# Patient Record
Sex: MALE | NOT HISPANIC OR LATINO | Employment: UNEMPLOYED | URBAN - METROPOLITAN AREA
[De-identification: names, ages, dates, MRNs, and addresses within clinical notes are randomized per-mention and may not be internally consistent; named-entity substitution may affect disease eponyms.]

---

## 2023-12-11 ENCOUNTER — OFFICE VISIT (OUTPATIENT)
Dept: PRIMARY CARE | Facility: CLINIC | Age: 28
End: 2023-12-11
Payer: COMMERCIAL

## 2023-12-11 VITALS
HEIGHT: 75 IN | BODY MASS INDEX: 14.42 KG/M2 | WEIGHT: 116 LBS | DIASTOLIC BLOOD PRESSURE: 80 MMHG | SYSTOLIC BLOOD PRESSURE: 102 MMHG

## 2023-12-11 DIAGNOSIS — G71.11 MYOTONIC MUSCULAR DYSTROPHY (MULTI): Primary | ICD-10-CM

## 2023-12-11 DIAGNOSIS — Z00.00 HEALTH CARE MAINTENANCE: ICD-10-CM

## 2023-12-11 PROBLEM — G47.00 INSOMNIA: Status: RESOLVED | Noted: 2023-12-11 | Resolved: 2023-12-11

## 2023-12-11 PROBLEM — F32.A ANXIETY AND DEPRESSION: Status: ACTIVE | Noted: 2023-12-11

## 2023-12-11 PROBLEM — E87.5 HIGH POTASSIUM: Status: RESOLVED | Noted: 2023-12-11 | Resolved: 2023-12-11

## 2023-12-11 PROBLEM — K59.09 CHRONIC CONSTIPATION: Status: ACTIVE | Noted: 2023-12-11

## 2023-12-11 PROBLEM — F41.9 ANXIETY AND DEPRESSION: Status: ACTIVE | Noted: 2023-12-11

## 2023-12-11 PROBLEM — R09.A2 GLOBUS SENSATION: Status: RESOLVED | Noted: 2023-12-11 | Resolved: 2023-12-11

## 2023-12-11 PROBLEM — E55.9 VITAMIN D DEFICIENCY: Status: ACTIVE | Noted: 2023-12-11

## 2023-12-11 PROBLEM — R13.10 PILL DYSPHAGIA: Status: ACTIVE | Noted: 2023-12-11

## 2023-12-11 PROCEDURE — 90471 IMMUNIZATION ADMIN: CPT | Performed by: STUDENT IN AN ORGANIZED HEALTH CARE EDUCATION/TRAINING PROGRAM

## 2023-12-11 PROCEDURE — 1036F TOBACCO NON-USER: CPT | Performed by: STUDENT IN AN ORGANIZED HEALTH CARE EDUCATION/TRAINING PROGRAM

## 2023-12-11 PROCEDURE — 90472 IMMUNIZATION ADMIN EACH ADD: CPT | Performed by: STUDENT IN AN ORGANIZED HEALTH CARE EDUCATION/TRAINING PROGRAM

## 2023-12-11 PROCEDURE — 90686 IIV4 VACC NO PRSV 0.5 ML IM: CPT | Performed by: STUDENT IN AN ORGANIZED HEALTH CARE EDUCATION/TRAINING PROGRAM

## 2023-12-11 PROCEDURE — 99395 PREV VISIT EST AGE 18-39: CPT | Performed by: STUDENT IN AN ORGANIZED HEALTH CARE EDUCATION/TRAINING PROGRAM

## 2023-12-11 PROCEDURE — 90715 TDAP VACCINE 7 YRS/> IM: CPT | Performed by: STUDENT IN AN ORGANIZED HEALTH CARE EDUCATION/TRAINING PROGRAM

## 2023-12-11 NOTE — PROGRESS NOTES
Subjective   Patient ID: Anthony Arshad is a 28 y.o. male who presents for No chief complaint on file..  HPI  28yom here to establish care.  He has a history significant for anxiety as well as myotonic muscular dystrophy.  His only acute complaint today is of some left hip pain.  He has been working out and lifting heavy weights and has noticed some left hip pain when he lays flat and attempts to lift his leg off the bed.  He is limping a bit and it does interfere with his ambulation to an extent however it is not particularly painful when he stands up out of a chair or walks around.  No fevers or chills or anything systemic.  Additional concerns of some bumps on his penis, he has had some bumps on his shaft since he was about 14 years old and they have gotten progressively larger in number.  He has seen urology and dermatology with no answers for him.  STI screening was negative.  He would like to work with physical therapy because of his hip pain.  He has no other complaints or issues to talk about today.    PMHx: constipation, anxiety, myotonic muscular dystrophy, insomnia  SurgHx: cholecystectomy  FamHx: Myotonic dystrophy and mother type I, diabetes in the family  SocialHx: No illicit drugs drinking or smoking, lives with father, currently not working    12-point ROS was reviewed and is negative, unless otherwise noted in HPI    Objective   Vitals:    12/11/23 1308   BP: 102/80      GEN: alert, conversant   HEENT: PERRL, EOMI    NECK: supple, no LAD appreciated  CHEST: appropriate respiratory effort  CV: RRR   MSK: Left hip weakness against flexion at the hip, no pain on internal/external rotation, cachectic musculature diffusely  ABD: soft, nondistended, nontender  EXT: no obvious deformities  SKIN: warm, dry    Assessment and Plan:  1.  Left hip pain  Suspect flexor strain.   - Advised continued supportive care with stretches, icing, rest    2.  Myotonic muscular dystrophy  Relatively active.  Understanding  of his disease.  Does not follow with any specialist.  - Referral to Neurology    3.  Penile shaft bumps  Exam performed with informed consent.  Small white bumps potentially hair follicles versus dry skin versus pearly penile papules.  Cream provided.  Okay with sending him back to urologist however they previously do not have answers for him.    4.  Anxiety  On hydroxyzine PRN. Not interest in daily anxiety meds.    5.  Chronic constipation  Well-controlled.    Health Maintenance:   Vaccines: Flu (updated today), Tdap (updated today), COVID (declines)  Screening: none  Labs: None needed today    RTC in 12 months, or sooner PRN    This note is not finalized until attending attestation is present.    Daryl Byrne    Trainee role: Resident    I saw and evaluated the patient. I personally obtained the key and critical portions of the history and physical exam or was physically present for key and critical portions performed by the trainee. I reviewed the trainee's documentation and discussed the patient with the trainee. I agree with the trainee's medical decision making as documented on the trainee's notes.    Lorne Ball, DO

## 2024-01-15 ENCOUNTER — APPOINTMENT (OUTPATIENT)
Dept: NEUROLOGY | Facility: CLINIC | Age: 29
End: 2024-01-15
Payer: COMMERCIAL

## 2024-01-16 ENCOUNTER — OFFICE VISIT (OUTPATIENT)
Dept: DERMATOLOGY | Facility: CLINIC | Age: 29
End: 2024-01-16
Payer: COMMERCIAL

## 2024-01-16 DIAGNOSIS — L85.3 XEROSIS CUTIS: ICD-10-CM

## 2024-01-16 DIAGNOSIS — L21.9 SEBORRHEIC DERMATITIS: ICD-10-CM

## 2024-01-16 DIAGNOSIS — L44.1 LICHEN NITIDUS: Primary | ICD-10-CM

## 2024-01-16 PROCEDURE — 1036F TOBACCO NON-USER: CPT | Performed by: DERMATOLOGY

## 2024-01-16 PROCEDURE — 99203 OFFICE O/P NEW LOW 30 MIN: CPT | Performed by: DERMATOLOGY

## 2024-01-16 RX ORDER — KETOCONAZOLE 20 MG/G
CREAM TOPICAL 2 TIMES DAILY
Qty: 60 G | Refills: 2 | Status: SHIPPED | OUTPATIENT
Start: 2024-01-16 | End: 2024-02-26

## 2024-01-16 ASSESSMENT — DERMATOLOGY PATIENT ASSESSMENT
HAVE YOU HAD OR DO YOU HAVE VASCULAR DISEASE: NO
ARE YOU AN ORGAN TRANSPLANT RECIPIENT: NO
WHERE ARE THESE NEW OR CHANGING LESIONS LOCATED: PENIS
DO YOU USE A TANNING BED: NO
DO YOU HAVE ANY NEW OR CHANGING LESIONS: YES
HAVE YOU HAD OR DO YOU HAVE A STAPH INFECTION: NO

## 2024-01-16 ASSESSMENT — DERMATOLOGY QUALITY OF LIFE (QOL) ASSESSMENT
RATE HOW BOTHERED YOU ARE BY EFFECTS OF YOUR SKIN PROBLEMS ON YOUR ACTIVITIES (EG, GOING OUT, ACCOMPLISHING WHAT YOU WANT, WORK ACTIVITIES OR YOUR RELATIONSHIPS WITH OTHERS): 0 - NEVER BOTHERED
ARE THERE EXCLUSIONS OR EXCEPTIONS FOR THE QUALITY OF LIFE ASSESSMENT: NO
RATE HOW BOTHERED YOU ARE BY SYMPTOMS OF YOUR SKIN PROBLEM (EG, ITCHING, STINGING BURNING, HURTING OR SKIN IRRITATION): 0 - NEVER BOTHERED
DATE THE QUALITY-OF-LIFE ASSESSMENT WAS COMPLETED: 66855
RATE HOW EMOTIONALLY BOTHERED YOU ARE BY YOUR SKIN PROBLEM (FOR EXAMPLE, WORRY, EMBARRASSMENT, FRUSTRATION): 0 - NEVER BOTHERED

## 2024-01-16 ASSESSMENT — ITCH NUMERIC RATING SCALE: HOW SEVERE IS YOUR ITCHING?: 5

## 2024-01-16 ASSESSMENT — PATIENT GLOBAL ASSESSMENT (PGA): PATIENT GLOBAL ASSESSMENT: PATIENT GLOBAL ASSESSMENT:  1 - CLEAR

## 2024-01-16 NOTE — PROGRESS NOTES
Subjective     Anthony Arshad is a 28 y.o. male who presents for the following: Suspicious Skin Lesion (Bumps on penis).     First time he was seen was by a urologist 10+ years ago, did not get an answer, referred to derm    Review of Systems:  No other skin or systemic complaints other than what is documented elsewhere in the note.    The following portions of the chart were reviewed this encounter and updated as appropriate:   Tobacco  Allergies  Meds  Problems  Med Hx  Surg Hx         Skin Cancer History  No skin cancer on file.      Specialty Problems    None       Objective   Well appearing patient in no apparent distress; mood and affect are within normal limits.    A focused skin examination was performed. All findings within normal limits unless otherwise noted below.    Assessment/Plan   1. Lichen nitidus  Dorsal Penile Shaft  Approx 15-20 pinpoint white flat-topped papules scattered on mid to distal shaft of penis    Reassurance. This is a benign condition and is not contagious. Unfortunately it can be difficult to treat. Today we will focus on treating the candida/seb derm rash (see below).     At next visit we could consider adding a topical steroid. We will defer today since it would likely worsen if he has tinea    2. Xerosis cutis  Dry skin    -Discussed nature of condition  -Discussed gentle skin care habits including using gentle soap such as Dove or Cetaphil to cleanse the skin.   -Recommend to avoid harsh cleansers/soaps such as: Dial, Lever 2000, Slovak Spring.  -Recommend to use emollients twice daily, once immediately after the shower while the skin is still damp.   -Recommended emollients include: Aveeno Eczema Therapy or Daily Moisturizer, La Roche Posay Lipikar AP Blam, or plain Vaseline.   -Recommend to avoid hot water/showers; lukewarm or cool water/showers will be beneficial.     - info in AVS        3. Seborrheic dermatitis  Mons Pubis, Root of the Penis  Patchy flaky scale  symmetrically over mon and extending onto shaft of penis, light pink erythema     Clinically favor seborrheic dermatitis but also consider early candidal intertrigo given recent exposure to candida    - rx ketoconazole as below    Related Procedures  Follow Up In Dermatology - Established Patient    Related Medications  ketoconazole (NIZOral) 2 % cream  Apply topically 2 times a day. To the affected area of itchy, scaly skin for 4 weeks        Follow up 2-3 months for rash if not resolved

## 2024-01-16 NOTE — PATIENT INSTRUCTIONS
Lichen nitidus - small bumps on penis  Benign, NOT contagious, but limited treatment options    Dry Skin    Dry skin can occur at any age and for many reasons. In general, skin becomes drier as we age. It is drier in the winter months than in summer months, and drier in low-humidity climates than in high humidity climates. Skin looks and feels dry because it lacks water. However, your skin’s natural oils are necessary to prevent it from drying out. Our goal is to replace the oil in order to keep the water in your skin.    In some people, areas of seriously dry skin can lead to a condition called dermatitis in which the skin becomes inflamed. When dermatitis is present, your dermatologist may prescribe a corticosteroid cream or ointment. This cream is applied to the affected areas only. Discontinue the corticosteroid cream when the dermatitis clears. The use of a moisturizing lotion, cream, or ointment should be continued to help prevent recurrence of the dermatitis.    Avoid hot baths and showers -- hot water removes your natural skin oils more quickly.  Keep showers or bath brief (5-10 minutes).  Use a mild soap or cleanser (bar or liquid body wash):  Dove      Cetaphil   Vanicream  Aveeno  Oil of Olay     If you have eczema or sensitive skin, look for formulas that are for “sensitive skin” or “fragrance-free.” “Unscented” products may still contain perfumes.  If your skin is extra-dry, you may only need to use soap daily to the underarms and groin. Use soap to the whole body only 2-3 times weekly.  When you get out of the bath or shower, pat skin dry.  Use the “3-Minute Rule” after you pat yourself dry: apply a moisturizer within 3 minutes of getting out of the bath.  Good moisturizers:  CeraVe    Aveeno    Petroleum jelly (Vaseline)  Cetaphil    Vegetable oil    Aquaphor  Nutraderm    Olive oil    Mineral oil  Eucerin   Neutrogena Norwegian Formula    *Note:  In general, creams are better moisturizers than  lotions.    You may need to reapply moisturizers 2-4 times a day.

## 2024-01-21 ENCOUNTER — PATIENT MESSAGE (OUTPATIENT)
Dept: DERMATOLOGY | Facility: HOSPITAL | Age: 29
End: 2024-01-21
Payer: COMMERCIAL

## 2024-02-21 ENCOUNTER — PATIENT MESSAGE (OUTPATIENT)
Dept: DERMATOLOGY | Facility: HOSPITAL | Age: 29
End: 2024-02-21
Payer: COMMERCIAL

## 2024-02-21 RX ORDER — POLYETHYLENE GLYCOL 3350 17 G/17G
POWDER, FOR SOLUTION ORAL AS NEEDED
COMMUNITY
Start: 2021-07-28

## 2024-02-21 RX ORDER — TRAZODONE HYDROCHLORIDE 50 MG/1
TABLET ORAL
COMMUNITY
Start: 2021-09-20 | End: 2024-02-26

## 2024-02-21 RX ORDER — HYDROXYZINE HYDROCHLORIDE 25 MG/1
TABLET, FILM COATED ORAL AS NEEDED
COMMUNITY
Start: 2021-07-08 | End: 2024-03-25

## 2024-02-21 RX ORDER — SERTRALINE HYDROCHLORIDE 25 MG/1
1 TABLET, FILM COATED ORAL DAILY
COMMUNITY
Start: 2021-08-06 | End: 2024-03-25 | Stop reason: SDUPTHER

## 2024-02-21 RX ORDER — ERGOCALCIFEROL 1.25 MG/1
CAPSULE ORAL
COMMUNITY
Start: 2021-09-24 | End: 2024-02-26

## 2024-02-22 NOTE — TELEPHONE ENCOUNTER
From: Anthony Arshad  To: Eliza Calderon MD  Sent: 2/21/2024 8:08 PM EST  Subject: No improvement     Hey doc so as prescribed I have finished the month treatment and I have seen little to no improvement I was wondering if there’s something else that can be done to fix the problem

## 2024-02-26 ENCOUNTER — OFFICE VISIT (OUTPATIENT)
Dept: GASTROENTEROLOGY | Facility: CLINIC | Age: 29
End: 2024-02-26
Payer: COMMERCIAL

## 2024-02-26 VITALS
SYSTOLIC BLOOD PRESSURE: 111 MMHG | TEMPERATURE: 97.9 F | BODY MASS INDEX: 14.25 KG/M2 | WEIGHT: 114.64 LBS | DIASTOLIC BLOOD PRESSURE: 64 MMHG | HEART RATE: 60 BPM | OXYGEN SATURATION: 98 % | HEIGHT: 75 IN

## 2024-02-26 DIAGNOSIS — K92.1 HEMATOCHEZIA: Primary | ICD-10-CM

## 2024-02-26 DIAGNOSIS — K59.09 CHRONIC CONSTIPATION: ICD-10-CM

## 2024-02-26 PROCEDURE — 1036F TOBACCO NON-USER: CPT | Performed by: NURSE PRACTITIONER

## 2024-02-26 PROCEDURE — 99213 OFFICE O/P EST LOW 20 MIN: CPT | Performed by: NURSE PRACTITIONER

## 2024-02-26 ASSESSMENT — ENCOUNTER SYMPTOMS
WHEEZING: 0
EYE PAIN: 0
PALPITATIONS: 0
CHILLS: 0
JOINT SWELLING: 0
MYALGIAS: 0
PHOTOPHOBIA: 0
HALLUCINATIONS: 0
LIGHT-HEADEDNESS: 0
ADENOPATHY: 0
HEADACHES: 0
DIZZINESS: 0
SHORTNESS OF BREATH: 0
NUMBNESS: 0
FATIGUE: 0
DYSURIA: 0
AGITATION: 0
HEMATURIA: 0
ARTHRALGIAS: 0
BACK PAIN: 0
WEAKNESS: 0
FLANK PAIN: 0
DIAPHORESIS: 0
SORE THROAT: 0
FEVER: 0
COUGH: 0
FREQUENCY: 0
NERVOUS/ANXIOUS: 0

## 2024-02-26 NOTE — PROGRESS NOTES
Subjective   Patient ID: Anthony Arshad is a 28 y.o. male who presents for rectal bleeding.     This is a 28 year old AAM with history of marijuana use, anxiety, myotonic muscular dystrophy, and chronic constipation who is presenting to the GI clinic for follow up. Last seen in the GI clinic by Chelsey Velasquez CNP on 9/8/21.     History per pt, father, and review of EMR     Pt is accompanied to this visit by his father     Reports 1-2 months ago he began having intermittent painless rectal bleeding with defecation. The blood is mostly bright, but has been dark red on occasion. He's seen blood on the toilet paper and coating the tips of his stools.     Reports having 1-2 large bowel movements per week which is improved from prior. Admits to moving his bowels only once a month in the past. Uses Miralax on occasion.     Constipation started at age 11.     Does not drink much water. Diet does not include many fruits and vegetables.     He's never been able to swallow pills secondary to muscular dystrophy.     Denies recent weight loss, abdominal pain, change in stool caliber, diarrhea, hematemesis, or melena.       Past medical history:  See above     Past surgical history:   Laparoscopic cholecystectomy (2021 )     Family history:   No known GI cancers or IBD    Social history:   Smokes marijuana daily   Drinks wine on rare occasion; no binge drinking  Denies use of tobacco       Review of Systems   Constitutional:  Negative for chills, diaphoresis, fatigue and fever.   HENT:  Negative for congestion, ear pain, hearing loss, sneezing and sore throat.    Eyes:  Negative for photophobia, pain and visual disturbance.   Respiratory:  Negative for cough, shortness of breath and wheezing.    Cardiovascular:  Negative for chest pain, palpitations and leg swelling.   Endocrine: Negative for cold intolerance and heat intolerance.   Genitourinary:  Negative for dysuria, flank pain, frequency and hematuria.   Musculoskeletal:   "Negative for arthralgias, back pain, gait problem, joint swelling and myalgias.   Skin:  Negative for rash.   Neurological:  Negative for dizziness, syncope, weakness, light-headedness, numbness and headaches.   Hematological:  Negative for adenopathy.   Psychiatric/Behavioral:  Negative for agitation and hallucinations. The patient is not nervous/anxious.        Allergies   Allergen Reactions    Peach Other     Throat itching     Tree Nuts Other     Peanut butter     Current Outpatient Medications   Medication Sig Dispense Refill    hydrOXYzine HCL (Atarax) 25 mg tablet Take by mouth if needed.      polyethylene glycol (Glycolax, Miralax) 17 gram/dose powder Take by mouth if needed.      sertraline (Zoloft) 25 mg tablet Take 1 tablet (25 mg) by mouth once daily.       No current facility-administered medications for this visit.        Objective     /64   Pulse 60   Temp 36.6 °C (97.9 °F)   Ht 1.905 m (6' 3\")   Wt 52 kg (114 lb 10.2 oz)   SpO2 98%   BMI 14.33 kg/m²     Physical Exam  Constitutional:       General: He is not in acute distress.     Appearance: Normal appearance.   HENT:      Head: Normocephalic and atraumatic.   Eyes:      Conjunctiva/sclera: Conjunctivae normal.   Cardiovascular:      Rate and Rhythm: Normal rate and regular rhythm.      Heart sounds: No murmur heard.     No gallop.   Pulmonary:      Effort: Pulmonary effort is normal.      Breath sounds: Normal breath sounds.   Abdominal:      General: Bowel sounds are normal. There is no distension.      Tenderness: There is no abdominal tenderness. There is no guarding.   Musculoskeletal:         General: No swelling or deformity. Normal range of motion.      Cervical back: Normal range of motion. No rigidity.   Skin:     General: Skin is warm and dry.      Coloration: Skin is not jaundiced.      Findings: No lesion or rash.   Neurological:      General: No focal deficit present.      Mental Status: He is alert and oriented to person, " place, and time.   Psychiatric:         Mood and Affect: Mood normal.     DAMIAN: No external hemorrhoids or anal fissures; no mass in rectal vault      Assessment/Plan   Problem List Items Addressed This Visit       Chronic constipation     Other Visit Diagnoses       Hematochezia    -  Primary    Relevant Orders    CBC           Hematochezia: question internal hemorrhoids in the setting of constipation. DAMIAN unrevealing. I do not believe there is any urgency for colonoscopy. Last CBC was in 2021 and was without anemia.   - check CBC  - will consider colonoscopy at follow up     2. Chronic constipation:  - recommend Miralax 17 grams once daily (and to use PRN if he develops diarrhea with regular use)   - advise high fiber diet; reading materials provided   - recommend drinking 64 ounces of water per day      3. Follow up:  - return to clinic in 8-10 weeks

## 2024-02-26 NOTE — PATIENT INSTRUCTIONS
Thanks for coming to the GI clinic to the GI clinic.     Please get blood work to check for anemia.     Use  polyethylene glycol (Miralax) 17 grams once daily. If you develop diarrhea, change to 17 grams once a day as needed.     Try to adhere to a high fiber diet.     Try to drink 64 ounces of water per day.     Follow up in 8-10 weeks.

## 2024-03-15 ENCOUNTER — APPOINTMENT (OUTPATIENT)
Dept: PRIMARY CARE | Facility: CLINIC | Age: 29
End: 2024-03-15
Payer: COMMERCIAL

## 2024-03-25 ENCOUNTER — OFFICE VISIT (OUTPATIENT)
Dept: PRIMARY CARE | Facility: CLINIC | Age: 29
End: 2024-03-25
Payer: COMMERCIAL

## 2024-03-25 VITALS
WEIGHT: 119.6 LBS | BODY MASS INDEX: 14.87 KG/M2 | DIASTOLIC BLOOD PRESSURE: 67 MMHG | SYSTOLIC BLOOD PRESSURE: 106 MMHG | HEIGHT: 75 IN

## 2024-03-25 DIAGNOSIS — F32.A ANXIETY AND DEPRESSION: Primary | ICD-10-CM

## 2024-03-25 DIAGNOSIS — Z13.220 LIPID SCREENING: ICD-10-CM

## 2024-03-25 DIAGNOSIS — G71.11 MYOTONIC MUSCULAR DYSTROPHY (MULTI): ICD-10-CM

## 2024-03-25 DIAGNOSIS — F41.9 ANXIETY AND DEPRESSION: Primary | ICD-10-CM

## 2024-03-25 DIAGNOSIS — E55.9 VITAMIN D DEFICIENCY: ICD-10-CM

## 2024-03-25 PROCEDURE — 1036F TOBACCO NON-USER: CPT | Performed by: NURSE PRACTITIONER

## 2024-03-25 PROCEDURE — 99214 OFFICE O/P EST MOD 30 MIN: CPT | Performed by: NURSE PRACTITIONER

## 2024-03-25 RX ORDER — SERTRALINE HYDROCHLORIDE 25 MG/1
25 TABLET, FILM COATED ORAL DAILY
Qty: 90 TABLET | Refills: 1 | Status: SHIPPED | OUTPATIENT
Start: 2024-03-25

## 2024-03-25 RX ORDER — HYDROXYZINE HYDROCHLORIDE 10 MG/5ML
25 SYRUP ORAL 3 TIMES DAILY PRN
Qty: 240 ML | Refills: 1 | Status: SHIPPED | OUTPATIENT
Start: 2024-03-25 | End: 2024-05-06

## 2024-03-25 ASSESSMENT — ENCOUNTER SYMPTOMS
RESTLESSNESS: 1
LOSS OF SENSATION IN FEET: 0
NERVOUS/ANXIOUS: 1
DEPRESSION: 0
OCCASIONAL FEELINGS OF UNSTEADINESS: 0

## 2024-03-25 ASSESSMENT — PATIENT HEALTH QUESTIONNAIRE - PHQ9
2. FEELING DOWN, DEPRESSED OR HOPELESS: NOT AT ALL
1. LITTLE INTEREST OR PLEASURE IN DOING THINGS: NOT AT ALL
SUM OF ALL RESPONSES TO PHQ9 QUESTIONS 1 AND 2: 0

## 2024-03-26 NOTE — PROGRESS NOTES
"Subjective   Patient ID: Anthony Arshad is a 28 y.o. male who presents for Anxiety.     is a 28 year old male who presents to the office today to re-establish care. He is accompanied by his father. He and his father requesting for him to restart medication for anxiety. Requesting oral suspension for as needed medication since he does have difficulty swallowing at times.     Medical history of myotonic muscular dystropy. Has been having increased upper body weakness. Father stated that  is often fidgety; particularly with his hands.    Anxiety  Presents for follow-up visit. Symptoms include nervous/anxious behavior and restlessness.          Review of Systems   Psychiatric/Behavioral:  The patient is nervous/anxious.    All other systems reviewed and are negative.      Objective   /67   Ht 1.905 m (6' 3\")   Wt 54.3 kg (119 lb 9.6 oz)   BMI 14.95 kg/m²     Physical Exam  Constitutional:       Appearance: Normal appearance.   HENT:      Head: Normocephalic and atraumatic.      Right Ear: Tympanic membrane, ear canal and external ear normal.      Left Ear: Tympanic membrane, ear canal and external ear normal.      Nose: Nose normal.      Mouth/Throat:      Mouth: Mucous membranes are moist.      Pharynx: Oropharynx is clear.   Eyes:      Extraocular Movements: Extraocular movements intact.      Conjunctiva/sclera: Conjunctivae normal.      Pupils: Pupils are equal, round, and reactive to light.   Cardiovascular:      Rate and Rhythm: Normal rate and regular rhythm.   Pulmonary:      Effort: Pulmonary effort is normal.      Breath sounds: Normal breath sounds.   Abdominal:      General: Abdomen is flat. Bowel sounds are normal.      Palpations: Abdomen is soft.   Musculoskeletal:         General: Normal range of motion.      Cervical back: Normal range of motion.      Comments: BUE 3/5. ROM of BUEs. Decreased ROM with abduction/adduction bilateral arms.    Skin:     General: Skin is warm and dry. "   Neurological:      General: No focal deficit present.      Mental Status: He is alert and oriented to person, place, and time. Mental status is at baseline.   Psychiatric:         Mood and Affect: Mood normal.         Behavior: Behavior normal.         Thought Content: Thought content normal.         Judgment: Judgment normal.         Assessment/Plan   Problem List Items Addressed This Visit             ICD-10-CM    Anxiety and depression - Primary F41.9, F32.A    Relevant Medications    sertraline (Zoloft) 25 mg tablet    hydrOXYzine (Atarax) 10 mg/5 mL syrup    Myotonic muscular dystrophy (CMS/HCC) G71.11     Stable. Continue to monitor.         Relevant Orders    Referral to Physical Therapy    Referral to Occupational Medicine    CBC and Auto Differential    Comprehensive Metabolic Panel    Referral to Pulmonology    Vitamin D deficiency E55.9    Relevant Orders    Vitamin D 25-Hydroxy,Total (for eval of Vitamin D levels)     Other Visit Diagnoses         Codes    Lipid screening     Z13.220    Relevant Orders    Lipid Panel          1) Anxiety: prescriptions refilled for Sertraline and Hydroxyzine. Discussed side effects of SSRI (nausea, vomiting, headache, insomnia, abdominal pain, fatigue, irritation, increased moodiness and diarrhea). Discussed with you that SSRIs help to restore the balance of Serotonin in the brain.    2) Myotonic muscular dystropy, type II: referral placed for OT/PT for upper body/extremities weakness.

## 2024-03-28 ENCOUNTER — TELEPHONE (OUTPATIENT)
Dept: PRIMARY CARE | Facility: CLINIC | Age: 29
End: 2024-03-28
Payer: COMMERCIAL

## 2024-03-29 DIAGNOSIS — G71.11 MYOTONIC MUSCULAR DYSTROPHY (MULTI): Primary | ICD-10-CM

## 2024-03-29 NOTE — TELEPHONE ENCOUNTER
Spoke with patient father and he said that when they tried to schedule for the appointment that they weren't able to because the  told them that it needed to say occupational therapy and not medicine.

## 2024-04-15 ENCOUNTER — EVALUATION (OUTPATIENT)
Dept: OCCUPATIONAL THERAPY | Facility: CLINIC | Age: 29
End: 2024-04-15
Payer: COMMERCIAL

## 2024-04-15 DIAGNOSIS — R29.898 BILATERAL ARM WEAKNESS: Primary | ICD-10-CM

## 2024-04-15 DIAGNOSIS — G71.11 MYOTONIC MUSCULAR DYSTROPHY (MULTI): ICD-10-CM

## 2024-04-15 PROCEDURE — 97165 OT EVAL LOW COMPLEX 30 MIN: CPT | Mod: GO

## 2024-04-15 PROCEDURE — 97110 THERAPEUTIC EXERCISES: CPT | Mod: GO

## 2024-04-15 ASSESSMENT — PAIN SCALES - GENERAL: PAINLEVEL_OUTOF10: 0 - NO PAIN

## 2024-04-15 ASSESSMENT — ENCOUNTER SYMPTOMS
LOSS OF SENSATION IN FEET: 0
DEPRESSION: 0
OCCASIONAL FEELINGS OF UNSTEADINESS: 1

## 2024-04-15 ASSESSMENT — PAIN - FUNCTIONAL ASSESSMENT: PAIN_FUNCTIONAL_ASSESSMENT: 0-10

## 2024-04-15 NOTE — PROGRESS NOTES
Occupational Therapy    Occupational Therapy Evaluation    Name: Anthony Arshad  MRN: [id number]  : 1995  Date: 04/15/24  Time Calculation  Start Time: 0540  Stop Time: 0630  Time Calculation (min): 50 min  OT Evaluation Time Entry  OT Evaluation (Low) Time Entry: 40  OT Therapeutic Procedures Time Entry  Therapeutic Exercise Time Entry: 10                  Visit: 1  Tolentino: 30 visits  Assessment:   Patient is a 28 year old male with dx of myotonic muscular dystrophy. Patient presents with severe BUE weakness impacting patient's abilities to carry out every day tasks. Patient would benefit from skilled OT to address the above impairments to maximize patient's functional abilities.   Plan:   OT POC to include: neuro re-ed, manual therapy, therapeutic exercise, therapeutic activity, HEP    1-2x a week for 5-6 weeks  Rehab potential: Good  Patient in agreement with plan     Subjective  Patient agreeable to OT evaluation. Accompanied by father. Patient reports a lot of difficulty donning coat, and reaching up for things. Patient reports has dad wash his hair in the shower.     Dad reports patient received dx at about 9-10 years old. Started seeing a progression more in high school, and then has been getting worse in the last couple of years.    Current Problem:  1. Bilateral arm weakness  Follow Up In Occupational Therapy      2. Myotonic muscular dystrophy (Multi)  Follow Up In Occupational Therapy        General:      General  Reason for Referral: OT eval treat  Referred By:  (REGLA Neely)  Per chart note:   Medical history of myotonic muscular dystrophy. Has been having increased upper body weakness.  Precautions:  Precautions  STEADI Fall Risk Score (The score of 4 or more indicates an increased risk of falling): 2  Vital Signs:   Not taken  Pain Assessment:  Pain Assessment  Pain Assessment: 0-10  Pain Score: 0 - No pain  Work History:  Not working  Prior Level of Function:  Independent  "  Roles/Routines:  Plays video games  Patient Goal:  \"I would like to at least maintain\"  Personal Factors that my impact Care:   Not driving  Objective   Neuro:  Observation:  R handed  Palpation:  Severe atrophy noted in all muscles of B shoulders   ROM:  B shoulder PROM  Strength:  Right: elevation: 3+/5, shoulder flexion: 2-/5, elbow flexion: 4-/5, wrist extension: 3+/5  R : 20#  Left:  elevation: 3/5, shoulder flexion: 1/5, elbow flexion: 4-/5, wrist extension: 3+/5  L : 19#  Coordination:  Intact digit opposition  Sensation:  Denies parathesias in BUEs  Outcome Measures:  OT Adult Other Outcome Measures  9 Hole Peg Test:  (R: 20 seconds L: 23 seconds)    OP EDUCATION:/Treatment:   Educated patient on towel slides on table top. Also educated and given a piece of dycem to assist with opening containers.    Goals:  Active       OT Goals       HEP       Start:  04/15/24    Expected End:  06/24/24       Will establish a home program to include UE stretching and strengthening, as well as educate patient on AE/DME and modifications to maintain function for ADL/IADL tasks.                "

## 2024-04-22 ENCOUNTER — OFFICE VISIT (OUTPATIENT)
Dept: DERMATOLOGY | Facility: HOSPITAL | Age: 29
End: 2024-04-22
Payer: COMMERCIAL

## 2024-04-22 DIAGNOSIS — L21.9 SEBORRHEIC DERMATITIS: ICD-10-CM

## 2024-04-22 DIAGNOSIS — L44.1 LICHEN NITIDUS: Primary | ICD-10-CM

## 2024-04-22 PROCEDURE — 99214 OFFICE O/P EST MOD 30 MIN: CPT | Performed by: DERMATOLOGY

## 2024-04-22 PROCEDURE — 1036F TOBACCO NON-USER: CPT | Performed by: DERMATOLOGY

## 2024-04-22 RX ORDER — HYDROCORTISONE 25 MG/G
OINTMENT TOPICAL 2 TIMES DAILY
Qty: 30 G | Refills: 3 | Status: SHIPPED | OUTPATIENT
Start: 2024-04-22

## 2024-04-22 NOTE — PATIENT INSTRUCTIONS
Topical steroids and non-steroidal medications for chronic skin conditions    Your chronic skin condition is:    Your topical steroids are:    Hydrocortisone 2.5%    Your non-steroidal topicals are:    Ketoconazole 2% cream    Steroid usually works fastest but can only be used for 2 weeks at a time for flares or every other day for maintenance  Non-steroids are safer for long-term use and can be used every day for longer than 2 weeks or can be alternated with the steroid for maintenance       Hydrocortisone twice per day for 2 weeks, then decrease to just 2-3x per week (weekends only or [facility]). Repeat as needed for flares every 4-6 weeksj  Ketoconazole on the days that you are not using the hydrocortisone

## 2024-04-22 NOTE — PROGRESS NOTES
Subjective     Anthony Arshad is a 28 y.o. male who presents for the following: Rash (Tried Ketoconazole cream - did not work at all . Using Nov[employer] Spring soap ).     Review of Systems:  No other skin or systemic complaints other than what is documented elsewhere in the note.    The following portions of the chart were reviewed this encounter and updated as appropriate:   Tobacco  Allergies  Meds  Problems  Med Hx  Surg Hx         Skin Cancer History  No skin cancer on file.      Specialty Problems    None       Objective   Well appearing patient in no apparent distress; mood and affect are within normal limits.    A focused skin examination was performed. All findings within normal limits unless otherwise noted below.    Assessment/Plan   1. Lichen nitidus  Pubic  >20 now white flat-toped papules on proximal shaft of penis    hydrocortisone 2.5 % ointment - Pubic  Apply topically 2 times a day. To affected areas on body for 2 weeks, then decrease to just 2-3x per week (weekends only or MWF). Repeat as needed for flares every 4-6 weeks    Related Procedures  Follow Up In Dermatology - Established Patient    2. Seborrheic dermatitis  Pubic  Resolved     Resolved, continue ketoconazole cream and intersperse with hydrocortisone     Related Procedures  Follow Up In Dermatology - Established Patient        Follow up 2-3 months

## 2024-05-06 ENCOUNTER — OFFICE VISIT (OUTPATIENT)
Dept: PULMONOLOGY | Facility: CLINIC | Age: 29
End: 2024-05-06
Payer: COMMERCIAL

## 2024-05-06 VITALS
HEART RATE: 72 BPM | SYSTOLIC BLOOD PRESSURE: 111 MMHG | RESPIRATION RATE: 18 BRPM | WEIGHT: 115 LBS | HEIGHT: 75 IN | OXYGEN SATURATION: 98 % | TEMPERATURE: 98.8 F | DIASTOLIC BLOOD PRESSURE: 72 MMHG | BODY MASS INDEX: 14.3 KG/M2

## 2024-05-06 DIAGNOSIS — G71.11 MYOTONIC MUSCULAR DYSTROPHY (MULTI): ICD-10-CM

## 2024-05-06 DIAGNOSIS — R06.02 SHORTNESS OF BREATH: Primary | ICD-10-CM

## 2024-05-06 PROCEDURE — 99204 OFFICE O/P NEW MOD 45 MIN: CPT | Performed by: INTERNAL MEDICINE

## 2024-05-06 PROCEDURE — 99214 OFFICE O/P EST MOD 30 MIN: CPT | Performed by: INTERNAL MEDICINE

## 2024-05-06 PROCEDURE — 1036F TOBACCO NON-USER: CPT | Performed by: INTERNAL MEDICINE

## 2024-05-06 ASSESSMENT — ENCOUNTER SYMPTOMS
PALPITATIONS: 0
COUGH: 0
WEAKNESS: 1
SHORTNESS OF BREATH: 1
UNEXPECTED WEIGHT CHANGE: 0

## 2024-05-06 ASSESSMENT — PAIN SCALES - GENERAL: PAINLEVEL: 0-NO PAIN

## 2024-05-06 NOTE — PROGRESS NOTES
Department of Medicine  Division of Pulmonary, Critical Care, and Sleep Medicine  Location  [facility], [address]    I was asked by Litzy Fernandez APRN-C*, to evaluate Anthony Arshad for muscular dystrophy. I have independently interviewed and examined the patient in the office and reviewed available records.     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 He states approximately 2 to 3 months ago he has noticed episodes of shortness of breath lasting approximately 1 hour.  These episodes have been occurring every 1 to 2 days or so.  There are no triggers for these episodes of dyspnea.  He denies any cough, sputum production, wheezing, recent illnesses.  He does smoke marijuana daily, and sometimes, these episodes of shortness of breath occur after smoking.  He states he is able to walk 1 to 2 miles on flat ground without any issues.  He has been admitted 1 time for respiratory issues, however, this was anxiety related.  At that time, his pH on ABG was 7.65 and his pCO2 is 23.  He has never had any formal pulmonary function testing done before.  He denies any nighttime symptoms of dyspnea.  He denies any snoring. He was diagnosed with muscular dystrophy around the age of 11.    PMH:  Past Medical History:   Diagnosis Date    Insomnia 12/11/2023    Irregular heart beat     Myotonic muscular dystrophy (Multi)        PSH:  Past Surgical History:   Procedure Laterality Date    CHOLECYSTECTOMY         FHx:  Family History   Problem Relation Name Age of Onset    Multiple sclerosis Mother      Diabetes Father         Social Hx:  Social History     Socioeconomic History    Marital status: Single     Spouse name: None    Number of children: None    Years of education: None    Highest education level: None   Occupational History    None   Tobacco Use    Smoking status: Never    Smokeless tobacco: Never   Substance and Sexual Activity    Alcohol use: Yes      Comment: socially    Drug use: Yes     Frequency: 7.0 times per week     Types: Marijuana     Comment: everyday    Sexual activity: None   Other Topics Concern    None   Social History Narrative    None     Social Determinants of Health     Financial Resource Strain: Not on file   Food Insecurity: Not on file   Transportation Needs: Not on file   Physical Activity: Not on file   Stress: Not on file   Social Connections: Not on file   Intimate Partner Violence: Not on file   Housing Stability: Not on file       Immunization History:  Immunization History   Administered Date(s) Administered    Flu vaccine (IIV4), preservative free *Check age/dose* 12/11/2023    HPV 9-valent vaccine (GARDASIL 9) 05/11/2016, 02/01/2018    HPV, Quadrivalent 08/14/2014    Hepatitis B vaccine, pediatric/adolescent (RECOMBIVAX, ENGERIX) 08/14/2014    Influenza, injectable, quadrivalent 02/01/2018, 03/06/2020, 01/23/2023    Novel Influenza-H1N1-09, nasal 11/23/2009    Tdap vaccine, age 7 year and older (BOOSTRIX, ADACEL) 11/23/2009, 12/11/2023       Current Medications:  Current Outpatient Medications   Medication Instructions    hydrocortisone 2.5 % ointment Topical, 2 times daily, To affected areas on body for 2 weeks, then decrease to just 2-3x per week (weekends only or MWF). Repeat as needed for flares every 4-6 weeks    hydrOXYzine (ATARAX) 25 mg, oral, 3 times daily PRN    polyethylene glycol (Glycolax, Miralax) 17 gram/dose powder oral, As needed    sertraline (ZOLOFT) 25 mg, oral, Daily        Drug Allergies/Intolerances:  Allergies   Allergen Reactions    Peach Other     Throat itching     Tree Nuts Other     Peanut butter        Review of Systems:  Review of Systems   Constitutional:  Negative for unexpected weight change.   Respiratory:  Positive for shortness of breath. Negative for cough.    Cardiovascular:  Negative for chest pain, palpitations and leg swelling.   Neurological:  Positive for weakness.        All other review  "of systems are negative and/or non-contributory.    Physical Examination:  Vitals:    05/06/24 1516   BP: 111/72   BP Location: Right arm   Patient Position: Sitting   Pulse: 72   Resp: 18   Temp: 37.1 °C (98.8 °F)   TempSrc: Temporal   SpO2: 98%   Weight: 52.2 kg (115 lb)   Height: 1.905 m (6' 3\")        GEN: thin frame.   EYES: GIOVANNA, EOMI  ENT: Mallampati I,   CV: RRR, no m/g/r  LUNGS: good effort, clear bilaterally, no w/r/r  ABD: Soft, nontender  EXT: thin      Pulmonary Function Test Results     None    Exacerbation History     N/A    Chest Radiograph     XR CHEST 1 VIEW 12/17/2021    Hyperinflated    Chest CT Scan     No results found for this or any previous visit from the past 1095 days.       Bronchoscopy     None    Labs     Lab Results   Component Value Date    WBC 7.0 12/17/2021    HGB 13.5 12/17/2021    HCT 40.5 (L) 12/17/2021    MCV 90 12/17/2021     (L) 12/17/2021     No results found for: \"BNP\"  Lab Results   Component Value Date    EOSABS 0.35 12/17/2021         Echocardiogram     7/7/2021: EF 60-65%, RVSP 20mmHg       ASSESSMENT & PLAN     Problem List Items Addressed This Visit       Myotonic muscular dystrophy (Multi)    Relevant Orders    Complete Pulmonary Function Test Pre/Post Bronchodialator (Spirometry Pre/Post/DLCO/Lung Volumes)    Respiratory Muscle Force (MIP/MEP)    Arterial Blood Gas (ABG)    Blood Gas Arterial    Blood Gas Arterial, COOX    Shortness of breath - Primary        Summary:  28 y.o. year-old male with myotonic dystrophy type 2. He has started developing some episodes of dyspnea which are concerning for progression of his disease. Previous labwork was revealed an elevated serum bicarbonate level which is suggestive of chronic hypercapnic respiratory failure. Explained to the patient that should his disease progress further, he may need some ventilatory support with either NIV or negative pressure vest ventilator. Other beneficial therapies include incentive " spirometry and cough assist devices. Patients with muscular dystrophy are also prone to overnight hypoxia.    Plan:  -Full PFT  -ABG to check for CO2 retention  -MIP/MEP  -Overnight pulse oximetry testing with possible referral to sleep medicine pending results.    Follow-up: 6/24/2024       Suresh Chavez DO  Staff Physician - Pulmonary & Critical Care  05/06/24 3:20 PM  Office number: [phone]   Fax number:  [phone]

## 2024-05-13 ENCOUNTER — APPOINTMENT (OUTPATIENT)
Dept: OCCUPATIONAL THERAPY | Facility: CLINIC | Age: 29
End: 2024-05-13
Payer: COMMERCIAL

## 2024-05-13 ENCOUNTER — HOSPITAL ENCOUNTER (OUTPATIENT)
Dept: RESPIRATORY THERAPY | Facility: CLINIC | Age: 29
Discharge: HOME | End: 2024-05-13
Payer: COMMERCIAL

## 2024-05-13 DIAGNOSIS — G71.11 MYOTONIC MUSCULAR DYSTROPHY (MULTI): ICD-10-CM

## 2024-05-13 PROCEDURE — 94729 DIFFUSING CAPACITY: CPT | Performed by: INTERNAL MEDICINE

## 2024-05-13 PROCEDURE — 94799 UNLISTED PULMONARY SVC/PX: CPT

## 2024-05-13 PROCEDURE — 94727 GAS DIL/WSHOT DETER LNG VOL: CPT

## 2024-05-13 PROCEDURE — 36600 WITHDRAWAL OF ARTERIAL BLOOD: CPT

## 2024-05-13 PROCEDURE — 94010 BREATHING CAPACITY TEST: CPT | Performed by: INTERNAL MEDICINE

## 2024-05-13 PROCEDURE — 94727 GAS DIL/WSHOT DETER LNG VOL: CPT | Performed by: INTERNAL MEDICINE

## 2024-05-13 PROCEDURE — 94799 UNLISTED PULMONARY SVC/PX: CPT | Performed by: INTERNAL MEDICINE

## 2024-05-13 PROCEDURE — 94010 BREATHING CAPACITY TEST: CPT

## 2024-05-13 PROCEDURE — 94729 DIFFUSING CAPACITY: CPT

## 2024-05-20 ENCOUNTER — DOCUMENTATION (OUTPATIENT)
Dept: OCCUPATIONAL THERAPY | Facility: CLINIC | Age: 29
End: 2024-05-20
Payer: COMMERCIAL

## 2024-05-20 ENCOUNTER — APPOINTMENT (OUTPATIENT)
Dept: OCCUPATIONAL THERAPY | Facility: CLINIC | Age: 29
End: 2024-05-20
Payer: COMMERCIAL

## 2024-05-20 NOTE — PROGRESS NOTES
Occupational Therapy                 Therapy Communication Note    Patient Name: Anthony Arshad  MRN: [id number]  Today's Date: 5/20/2024     Discipline: Occupational Therapy      Missed Time: Cancel

## 2024-06-03 ENCOUNTER — TREATMENT (OUTPATIENT)
Dept: OCCUPATIONAL THERAPY | Facility: CLINIC | Age: 29
End: 2024-06-03
Payer: COMMERCIAL

## 2024-06-03 DIAGNOSIS — G71.11 MYOTONIC MUSCULAR DYSTROPHY (MULTI): ICD-10-CM

## 2024-06-03 DIAGNOSIS — R29.898 BILATERAL ARM WEAKNESS: Primary | ICD-10-CM

## 2024-06-03 PROCEDURE — 97110 THERAPEUTIC EXERCISES: CPT | Mod: GO

## 2024-06-03 ASSESSMENT — PAIN - FUNCTIONAL ASSESSMENT: PAIN_FUNCTIONAL_ASSESSMENT: 0-10

## 2024-06-03 ASSESSMENT — PAIN SCALES - GENERAL: PAINLEVEL_OUTOF10: 0 - NO PAIN

## 2024-06-03 NOTE — PROGRESS NOTES
Occupational Therapy    Occupational Therapy Treatment    Name: Anthony Arshad  MRN: [id number]  : 1995  Date: 24  Time Calculation  Start Time: 315  Stop Time: 0  Time Calculation (min): 45 min     OT Therapeutic Procedures Time Entry  Therapeutic Exercise Time Entry: 40                  Visit: 2  Tolentino: 30 visits   Assessment:   Patient responded well to treatment session, demonstrating good carryover of exercises.   Plan:   Continue with skilled OT POC. Patient would like to try NMES  next session.     Subjective   Patient agreeable to treatment session. No new issues to report.    Pain Assessment:  Pain Assessment  Pain Assessment: 0-10  Pain Score: 0 - No pain    Objective    Therapeutic Exercise: 40 mins  Completed B shoulder isometrics 3x, 30 second holds each way: flexion, extension, abduction, adduction.     Completed Stubmatic bike for 3 minutes forwards and 3 minutes backwards on low level.     Completed red putty exercises to B hands for strengthening: digit flexion, digit extension, digit adduction, tip to tip pinch, lateral pinch, digit abduction. Cues given for proper technique.      OP EDUCATION:   Isometric exercises for B shoulders- handouts given   Red putty and handouts given    Goals:  Active       OT Goals       HEP       Start:  04/15/24    Expected End:  24       Will establish a home program to include UE stretching and strengthening, as well as educate patient on AE/DME and modifications to maintain function for ADL/IADL tasks.

## 2024-06-10 ENCOUNTER — TREATMENT (OUTPATIENT)
Dept: OCCUPATIONAL THERAPY | Facility: CLINIC | Age: 29
End: 2024-06-10
Payer: COMMERCIAL

## 2024-06-10 DIAGNOSIS — R29.898 BILATERAL ARM WEAKNESS: Primary | ICD-10-CM

## 2024-06-10 DIAGNOSIS — G71.11 MYOTONIC MUSCULAR DYSTROPHY (MULTI): ICD-10-CM

## 2024-06-10 PROCEDURE — 97110 THERAPEUTIC EXERCISES: CPT | Mod: GO

## 2024-06-10 ASSESSMENT — PAIN - FUNCTIONAL ASSESSMENT: PAIN_FUNCTIONAL_ASSESSMENT: 0-10

## 2024-06-10 ASSESSMENT — PAIN SCALES - GENERAL: PAINLEVEL_OUTOF10: 0 - NO PAIN

## 2024-06-10 NOTE — PROGRESS NOTES
Occupational Therapy    Occupational Therapy Treatment    Name: Anthony Arshad  MRN: [id number]  : 1995  Date: 06/10/24  Time Calculation  Start Time: 245  Stop Time: 0  Time Calculation (min): 45 min     OT Therapeutic Procedures Time Entry  Therapeutic Exercise Time Entry: 40                  Visit: 3  Tolentino: 30 visits   Assessment:   Patient responded well to treatment session, demonstrating good carryover of exercises.   Plan:   Continue with skilled OT POC.     Subjective   Patient agreeable to treatment session. No new issues to report.    Pain Assessment:  Pain Assessment  Pain Assessment: 0-10  Pain Score: 0 - No pain    Objective    Therapeutic Exercise: 40 mins  With NMES on B middle and lower traps on [address], completed the following UE strengthening exercises with red theraband 10x each: rows, shoulder extension, external rotation, elbow flexion, elbow extension. Cues given for upright posture.    OP EDUCATION:   Red theraband exercises with handouts     Goals:  Active       OT Goals       HEP       Start:  04/15/24    Expected End:  24       Will establish a home program to include UE stretching and strengthening, as well as educate patient on AE/DME and modifications to maintain function for ADL/IADL tasks.

## 2024-06-24 ENCOUNTER — APPOINTMENT (OUTPATIENT)
Dept: PULMONOLOGY | Facility: CLINIC | Age: 29
End: 2024-06-24
Payer: COMMERCIAL

## 2024-06-29 ENCOUNTER — APPOINTMENT (OUTPATIENT)
Dept: CARDIOLOGY | Facility: HOSPITAL | Age: 29
End: 2024-06-29
Payer: COMMERCIAL

## 2024-06-29 ENCOUNTER — APPOINTMENT (OUTPATIENT)
Dept: RADIOLOGY | Facility: HOSPITAL | Age: 29
End: 2024-06-29
Payer: COMMERCIAL

## 2024-06-29 ENCOUNTER — HOSPITAL ENCOUNTER (EMERGENCY)
Facility: HOSPITAL | Age: 29
Discharge: HOME | End: 2024-06-30
Attending: STUDENT IN AN ORGANIZED HEALTH CARE EDUCATION/TRAINING PROGRAM
Payer: COMMERCIAL

## 2024-06-29 DIAGNOSIS — E87.6 HYPOKALEMIA: ICD-10-CM

## 2024-06-29 DIAGNOSIS — R79.89 ELEVATED TROPONIN LEVEL: ICD-10-CM

## 2024-06-29 DIAGNOSIS — R00.2 PALPITATIONS: Primary | ICD-10-CM

## 2024-06-29 LAB
ANION GAP SERPL CALC-SCNC: 14 MMOL/L (ref 10–20)
BASOPHILS # BLD AUTO: 0.05 X10*3/UL (ref 0–0.1)
BASOPHILS NFR BLD AUTO: 0.8 %
BUN SERPL-MCNC: 10 MG/DL (ref 6–23)
CALCIUM SERPL-MCNC: 9.5 MG/DL (ref 8.6–10.3)
CARDIAC TROPONIN I PNL SERPL HS: 38 NG/L (ref 0–20)
CARDIAC TROPONIN I PNL SERPL HS: 45 NG/L (ref 0–20)
CHLORIDE SERPL-SCNC: 105 MMOL/L (ref 98–107)
CO2 SERPL-SCNC: 26 MMOL/L (ref 21–32)
CREAT SERPL-MCNC: 0.62 MG/DL (ref 0.5–1.3)
D DIMER PPP FEU-MCNC: <0.19 MG/L FEU (ref 0.19–0.5)
EGFRCR SERPLBLD CKD-EPI 2021: >90 ML/MIN/1.73M*2
EOSINOPHIL # BLD AUTO: 0.48 X10*3/UL (ref 0–0.7)
EOSINOPHIL NFR BLD AUTO: 8.1 %
ERYTHROCYTE [DISTWIDTH] IN BLOOD BY AUTOMATED COUNT: 12.6 % (ref 11.5–14.5)
GLUCOSE SERPL-MCNC: 92 MG/DL (ref 74–99)
HCT VFR BLD AUTO: 46.4 % (ref 41–52)
HGB BLD-MCNC: 14.9 G/DL (ref 13.5–17.5)
IMM GRANULOCYTES # BLD AUTO: 0.01 X10*3/UL (ref 0–0.7)
IMM GRANULOCYTES NFR BLD AUTO: 0.2 % (ref 0–0.9)
LYMPHOCYTES # BLD AUTO: 2.68 X10*3/UL (ref 1.2–4.8)
LYMPHOCYTES NFR BLD AUTO: 45.2 %
MAGNESIUM SERPL-MCNC: 2.2 MG/DL (ref 1.6–2.4)
MCH RBC QN AUTO: 29.2 PG (ref 26–34)
MCHC RBC AUTO-ENTMCNC: 32.1 G/DL (ref 32–36)
MCV RBC AUTO: 91 FL (ref 80–100)
MONOCYTES # BLD AUTO: 0.35 X10*3/UL (ref 0.1–1)
MONOCYTES NFR BLD AUTO: 5.9 %
NEUTROPHILS # BLD AUTO: 2.36 X10*3/UL (ref 1.2–7.7)
NEUTROPHILS NFR BLD AUTO: 39.8 %
NRBC BLD-RTO: NORMAL /100{WBCS}
PLATELET # BLD AUTO: 202 X10*3/UL (ref 150–450)
POTASSIUM SERPL-SCNC: 3.3 MMOL/L (ref 3.5–5.3)
RBC # BLD AUTO: 5.11 X10*6/UL (ref 4.5–5.9)
SODIUM SERPL-SCNC: 142 MMOL/L (ref 136–145)
WBC # BLD AUTO: 5.9 X10*3/UL (ref 4.4–11.3)

## 2024-06-29 PROCEDURE — 36415 COLL VENOUS BLD VENIPUNCTURE: CPT | Performed by: STUDENT IN AN ORGANIZED HEALTH CARE EDUCATION/TRAINING PROGRAM

## 2024-06-29 PROCEDURE — 80048 BASIC METABOLIC PNL TOTAL CA: CPT | Performed by: STUDENT IN AN ORGANIZED HEALTH CARE EDUCATION/TRAINING PROGRAM

## 2024-06-29 PROCEDURE — 83735 ASSAY OF MAGNESIUM: CPT | Performed by: STUDENT IN AN ORGANIZED HEALTH CARE EDUCATION/TRAINING PROGRAM

## 2024-06-29 PROCEDURE — 84484 ASSAY OF TROPONIN QUANT: CPT | Performed by: STUDENT IN AN ORGANIZED HEALTH CARE EDUCATION/TRAINING PROGRAM

## 2024-06-29 PROCEDURE — 85300 ANTITHROMBIN III ACTIVITY: CPT | Performed by: STUDENT IN AN ORGANIZED HEALTH CARE EDUCATION/TRAINING PROGRAM

## 2024-06-29 PROCEDURE — 96360 HYDRATION IV INFUSION INIT: CPT

## 2024-06-29 PROCEDURE — 99283 EMERGENCY DEPT VISIT LOW MDM: CPT | Mod: 25

## 2024-06-29 PROCEDURE — 2500000001 HC RX 250 WO HCPCS SELF ADMINISTERED DRUGS (ALT 637 FOR MEDICARE OP): Performed by: STUDENT IN AN ORGANIZED HEALTH CARE EDUCATION/TRAINING PROGRAM

## 2024-06-29 PROCEDURE — 85025 COMPLETE CBC W/AUTO DIFF WBC: CPT | Performed by: STUDENT IN AN ORGANIZED HEALTH CARE EDUCATION/TRAINING PROGRAM

## 2024-06-29 PROCEDURE — 2500000004 HC RX 250 GENERAL PHARMACY W/ HCPCS (ALT 636 FOR OP/ED): Performed by: STUDENT IN AN ORGANIZED HEALTH CARE EDUCATION/TRAINING PROGRAM

## 2024-06-29 PROCEDURE — 84443 ASSAY THYROID STIM HORMONE: CPT | Performed by: STUDENT IN AN ORGANIZED HEALTH CARE EDUCATION/TRAINING PROGRAM

## 2024-06-29 PROCEDURE — 71046 X-RAY EXAM CHEST 2 VIEWS: CPT | Performed by: RADIOLOGY

## 2024-06-29 PROCEDURE — 71046 X-RAY EXAM CHEST 2 VIEWS: CPT

## 2024-06-29 PROCEDURE — 93005 ELECTROCARDIOGRAM TRACING: CPT

## 2024-06-29 PROCEDURE — 96361 HYDRATE IV INFUSION ADD-ON: CPT

## 2024-06-29 RX ORDER — POTASSIUM CHLORIDE 20 MEQ/1
40 TABLET, EXTENDED RELEASE ORAL ONCE
Status: DISCONTINUED | OUTPATIENT
Start: 2024-06-29 | End: 2024-06-29

## 2024-06-29 RX ORDER — POTASSIUM CHLORIDE 1.5 G/1.58G
40 POWDER, FOR SOLUTION ORAL ONCE
Status: COMPLETED | OUTPATIENT
Start: 2024-06-29 | End: 2024-06-29

## 2024-06-29 ASSESSMENT — PAIN SCALES - GENERAL
PAINLEVEL_OUTOF10: 0 - NO PAIN
PAINLEVEL_OUTOF10: 0 - NO PAIN

## 2024-06-29 ASSESSMENT — PAIN - FUNCTIONAL ASSESSMENT: PAIN_FUNCTIONAL_ASSESSMENT: 0-10

## 2024-06-29 ASSESSMENT — COLUMBIA-SUICIDE SEVERITY RATING SCALE - C-SSRS
1. IN THE PAST MONTH, HAVE YOU WISHED YOU WERE DEAD OR WISHED YOU COULD GO TO SLEEP AND NOT WAKE UP?: NO
6. HAVE YOU EVER DONE ANYTHING, STARTED TO DO ANYTHING, OR PREPARED TO DO ANYTHING TO END YOUR LIFE?: NO
2. HAVE YOU ACTUALLY HAD ANY THOUGHTS OF KILLING YOURSELF?: NO

## 2024-06-30 VITALS
WEIGHT: 114.2 LBS | SYSTOLIC BLOOD PRESSURE: 118 MMHG | TEMPERATURE: 98.6 F | BODY MASS INDEX: 14.66 KG/M2 | DIASTOLIC BLOOD PRESSURE: 68 MMHG | HEART RATE: 79 BPM | OXYGEN SATURATION: 100 % | RESPIRATION RATE: 13 BRPM | HEIGHT: 74 IN

## 2024-06-30 LAB — TSH SERPL DL<=0.05 MIU/L-ACNC: 1.02 MIU/L (ref 0.27–4.2)

## 2024-06-30 NOTE — ED PROVIDER NOTES
"HPI   Chief Complaint   Patient presents with    Palpitations     Per dad, pt has really bad anxiety. Dad states that pt has had palpitations for the last couple of days. Pt denies CP. Pt states he just feels a \"double beat\". Pt takes medication but dad states it hasn't been working. Pt has SOB, \" just hard to get a full breath\" Per dad pt has seen pulmonology         This is a 28-year-old male with history of myotonic muscular dystrophy and anxiety who presents to the ED due to palpitations for the past 3 days.  Patient states that they have essentially been constant for the past 3 days, although they have improved significantly since coming to the ED tonight.  He denies chest pain, but complains of shortness of breath, which is seemingly chronic has been unchanged with the palpitations.  No syncope but does complain of lightheadedness with standing.  His BMI is 14.6 and admittedly he hardly ever eats.  Denies any vomiting or diarrhea.  No new medications.  He takes hydroxyzine as needed for anxiety but states that this has not been helping his palpitations.                        Hyden Coma Scale Score: 15                     Patient History   Past Medical History:   Diagnosis Date    Insomnia 12/11/2023    Irregular heart beat     Myotonic muscular dystrophy (Multi)      Past Surgical History:   Procedure Laterality Date    CHOLECYSTECTOMY       Family History   Problem Relation Name Age of Onset    Multiple sclerosis Mother      Diabetes Father       Social History     Tobacco Use    Smoking status: Never    Smokeless tobacco: Never   Substance Use Topics    Alcohol use: Yes     Comment: socially    Drug use: Yes     Frequency: 7.0 times per week     Types: Marijuana     Comment: everyday       Physical Exam   ED Triage Vitals [06/29/24 2028]   Temperature Heart Rate Respirations BP   37 °C (98.6 °F) 80 18 115/73      Pulse Ox Temp Source Heart Rate Source Patient Position   100 % Oral Monitor Sitting      BP " Location FiO2 (%)     Left arm --       Physical Exam  Vitals and nursing note reviewed.   Constitutional:       General: He is not in acute distress.     Appearance: He is well-groomed and underweight.   HENT:      Head: Normocephalic and atraumatic.      Nose: Nose normal.      Mouth/Throat:      Mouth: Mucous membranes are moist.      Pharynx: Oropharynx is clear.   Eyes:      Extraocular Movements: Extraocular movements intact.      Conjunctiva/sclera: Conjunctivae normal.      Pupils: Pupils are equal, round, and reactive to light.   Cardiovascular:      Rate and Rhythm: Normal rate and regular rhythm.      Heart sounds: No murmur heard.  Pulmonary:      Effort: Pulmonary effort is normal. No respiratory distress.      Breath sounds: Normal breath sounds.   Abdominal:      Palpations: Abdomen is soft.      Tenderness: There is no abdominal tenderness.   Musculoskeletal:         General: No swelling.      Cervical back: Neck supple.      Right lower leg: No edema.      Left lower leg: No edema.   Skin:     General: Skin is warm and dry.   Neurological:      General: No focal deficit present.      Mental Status: He is alert and oriented to person, place, and time. Mental status is at baseline.   Psychiatric:         Mood and Affect: Mood normal.         Behavior: Behavior normal.         Thought Content: Thought content normal.         Judgment: Judgment normal.     Results for orders placed or performed during the hospital encounter of 06/29/24 (from the past 24 hour(s))   CBC and Auto Differential   Result Value Ref Range    WBC 5.9 4.4 - 11.3 x10*3/uL    nRBC      RBC 5.11 4.50 - 5.90 x10*6/uL    Hemoglobin 14.9 13.5 - 17.5 g/dL    Hematocrit 46.4 41.0 - 52.0 %    MCV 91 80 - 100 fL    MCH 29.2 26.0 - 34.0 pg    MCHC 32.1 32.0 - 36.0 g/dL    RDW 12.6 11.5 - 14.5 %    Platelets 202 150 - 450 x10*3/uL    Neutrophils % 39.8 40.0 - 80.0 %    Immature Granulocytes %, Automated 0.2 0.0 - 0.9 %    Lymphocytes % 45.2  13.0 - 44.0 %    Monocytes % 5.9 2.0 - 10.0 %    Eosinophils % 8.1 0.0 - 6.0 %    Basophils % 0.8 0.0 - 2.0 %    Neutrophils Absolute 2.36 1.20 - 7.70 x10*3/uL    Immature Granulocytes Absolute, Automated 0.01 0.00 - 0.70 x10*3/uL    Lymphocytes Absolute 2.68 1.20 - 4.80 x10*3/uL    Monocytes Absolute 0.35 0.10 - 1.00 x10*3/uL    Eosinophils Absolute 0.48 0.00 - 0.70 x10*3/uL    Basophils Absolute 0.05 0.00 - 0.10 x10*3/uL   Basic metabolic panel   Result Value Ref Range    Glucose 92 74 - 99 mg/dL    Sodium 142 136 - 145 mmol/L    Potassium 3.3 (L) 3.5 - 5.3 mmol/L    Chloride 105 98 - 107 mmol/L    Bicarbonate 26 21 - 32 mmol/L    Anion Gap 14 10 - 20 mmol/L    Urea Nitrogen 10 6 - 23 mg/dL    Creatinine 0.62 0.50 - 1.30 mg/dL    eGFR >90 >60 mL/min/1.73m*2    Calcium 9.5 8.6 - 10.3 mg/dL   Magnesium   Result Value Ref Range    Magnesium 2.20 1.60 - 2.40 mg/dL   D-dimer, quantitative   Result Value Ref Range    D-Dimer Non VTE, Quant (mg/L FEU) <0.19 (L) 0.19 - 0.50 mg/L FEU   Troponin I, High Sensitivity   Result Value Ref Range    Troponin I, High Sensitivity 38 (H) 0 - 20 ng/L   Troponin I, High Sensitivity   Result Value Ref Range    Troponin I, High Sensitivity 45 (H) 0 - 20 ng/L     XR chest 2 views    Result Date: 6/29/2024  Interpreted By:  Noel Lawler, STUDY: Chest dated  6/29/2024.   INDICATION: Signs/Symptoms:SOB   COMPARISON: Chest dated 12/17/2021.   ACCESSION NUMBER(S): [id number]   ORDERING CLINICIAN: MILLA HUTCHINS   TECHNIQUE: Two views of the chest.   FINDINGS: The lungs are clear.  No pneumothorax or effusion is evident. The cardiomediastinal silhouette is  not enlarged.The soft tissues are grossly unremarkable.       No acute cardiopulmonary process is evident.   MACRO: None   Signed by: Noel Lawler 6/29/2024 9:24 PM Dictation workstation:   [id number]       ED Course & MDM   Diagnoses as of 06/30/24 0059   Palpitations   Elevated troponin level   Hypokalemia       Medical  Decision Making  This is a 28-year-old male who presented to the ED with palpitations for the past 3 days.  Vital signs are stable in the ED, telemetry did not reveal any ectopy.  EKG showed normal sinus rhythm without any changes compared to previous.  Patient's potassium was low at 3.2, which was replaced.  He was also given IV fluids and stated that he felt improved and was denying any palpitations after treatment.  Interestingly, his troponin came back elevated at 38, and repeat was further elevated at 45.  Discussed this with cardiology on-call who felt that since the patient was asymptomatic, that he could be followed up outpatient for echocardiogram and Holter monitor.  Referral placed.  Patient and family were in agreement with this plan.             Cynthia Watson MD  06/30/24 0100

## 2024-06-30 NOTE — DISCHARGE INSTRUCTIONS
Please make an appointment to follow up with cardiology for a repeat echocardiogram and likely Holter monitor placement.

## 2024-07-01 ENCOUNTER — APPOINTMENT (OUTPATIENT)
Dept: PULMONOLOGY | Facility: CLINIC | Age: 29
End: 2024-07-01
Payer: COMMERCIAL

## 2024-07-01 LAB
ATRIAL RATE: 75 BPM
P AXIS: 86 DEGREES
P OFFSET: 182 MS
P ONSET: 135 MS
PR INTERVAL: 160 MS
Q ONSET: 215 MS
QRS COUNT: 12 BEATS
QRS DURATION: 116 MS
QT INTERVAL: 390 MS
QTC CALCULATION(BAZETT): 435 MS
QTC FREDERICIA: 419 MS
R AXIS: 93 DEGREES
T AXIS: 84 DEGREES
T OFFSET: 410 MS
VENTRICULAR RATE: 75 BPM

## 2024-07-22 ENCOUNTER — OFFICE VISIT (OUTPATIENT)
Dept: DERMATOLOGY | Facility: HOSPITAL | Age: 29
End: 2024-07-22
Payer: COMMERCIAL

## 2024-07-22 DIAGNOSIS — L44.1 LICHEN NITIDUS: ICD-10-CM

## 2024-07-22 PROCEDURE — 99214 OFFICE O/P EST MOD 30 MIN: CPT | Performed by: DERMATOLOGY

## 2024-07-22 PROCEDURE — 1036F TOBACCO NON-USER: CPT | Performed by: DERMATOLOGY

## 2024-07-22 RX ORDER — TACROLIMUS 1 MG/G
OINTMENT TOPICAL 2 TIMES DAILY
Qty: 30 G | Refills: 1 | Status: SHIPPED | OUTPATIENT
Start: 2024-07-22 | End: 2025-07-22

## 2024-07-22 ASSESSMENT — PATIENT GLOBAL ASSESSMENT (PGA): PATIENT GLOBAL ASSESSMENT: PATIENT GLOBAL ASSESSMENT:  1 - CLEAR

## 2024-07-22 ASSESSMENT — DERMATOLOGY QUALITY OF LIFE (QOL) ASSESSMENT
DATE THE QUALITY-OF-LIFE ASSESSMENT WAS COMPLETED: 67043
RATE HOW EMOTIONALLY BOTHERED YOU ARE BY YOUR SKIN PROBLEM (FOR EXAMPLE, WORRY, EMBARRASSMENT, FRUSTRATION): 0 - NEVER BOTHERED
RATE HOW BOTHERED YOU ARE BY EFFECTS OF YOUR SKIN PROBLEMS ON YOUR ACTIVITIES (EG, GOING OUT, ACCOMPLISHING WHAT YOU WANT, WORK ACTIVITIES OR YOUR RELATIONSHIPS WITH OTHERS): 0 - NEVER BOTHERED
RATE HOW BOTHERED YOU ARE BY SYMPTOMS OF YOUR SKIN PROBLEM (EG, ITCHING, STINGING BURNING, HURTING OR SKIN IRRITATION): 0 - NEVER BOTHERED
ARE THERE EXCLUSIONS OR EXCEPTIONS FOR THE QUALITY OF LIFE ASSESSMENT: NO

## 2024-07-22 ASSESSMENT — DERMATOLOGY PATIENT ASSESSMENT
HAVE YOU HAD OR DO YOU HAVE VASCULAR DISEASE: NO
DO YOU HAVE ANY NEW OR CHANGING LESIONS: NO
DO YOU USE A TANNING BED: NO
HAVE YOU HAD OR DO YOU HAVE A STAPH INFECTION: NO
ARE YOU AN ORGAN TRANSPLANT RECIPIENT: NO

## 2024-07-22 ASSESSMENT — ITCH NUMERIC RATING SCALE: HOW SEVERE IS YOUR ITCHING?: 0

## 2024-07-22 NOTE — PROGRESS NOTES
Subjective     Anthony Arshad is a 29 y.o. male who presents for the following: Dermatitis.     Last derm visit 4/22/24 for lichen nitidus of the penis - Rx hydrocortisone 2.5% oint. Previously with seb derm in the pubic region    No itch, but no improvement in appearance. Seems to be worsening and spreading. Using hydrocortisone 3x per week    Review of Systems:  No other skin or systemic complaints other than what is documented elsewhere in the note.    The following portions of the chart were reviewed this encounter and updated as appropriate:   Tobacco  Allergies  Meds  Problems  Med Hx  Surg Hx         Skin Cancer History  No skin cancer on file.      Specialty Problems    None       Objective   Well appearing patient in no apparent distress; mood and affect are within normal limits.    A focused skin examination was performed. All findings within normal limits unless otherwise noted below.    Assessment/Plan   1. Lichen nitidus  Pubic  >20 now white flat-toped papules on proximal shaft of penis, stable    No improvement with hydrocortisone    Switch to topical calcineurin inhibitor    Reassured that not contagious but can be difficult to treat  Try to minimize friction - use lubrication and minimize hair removal practices    tacrolimus (Protopic) 0.1 % ointment - Pubic  Apply topically 2 times a day. To areas of skin changes in the genital area for 2-3 months    Related Procedures  Follow Up In Dermatology - Established Patient  Follow Up In Dermatology - Established Patient    Related Medications  hydrocortisone 2.5 % ointment  Apply topically 2 times a day. To affected areas on body for 2 weeks, then decrease to just 2-3x per week (weekends only or MWF). Repeat as needed for flares every 4-6 weeks        Follow up 3-4 months

## 2024-07-29 ENCOUNTER — OFFICE VISIT (OUTPATIENT)
Dept: CARDIOLOGY | Facility: CLINIC | Age: 29
End: 2024-07-29
Payer: COMMERCIAL

## 2024-07-29 ENCOUNTER — ANCILLARY PROCEDURE (OUTPATIENT)
Dept: CARDIOLOGY | Facility: CLINIC | Age: 29
End: 2024-07-29
Payer: COMMERCIAL

## 2024-07-29 VITALS
OXYGEN SATURATION: 97 % | HEIGHT: 75 IN | SYSTOLIC BLOOD PRESSURE: 105 MMHG | BODY MASS INDEX: 13.68 KG/M2 | DIASTOLIC BLOOD PRESSURE: 72 MMHG | HEART RATE: 71 BPM | WEIGHT: 110 LBS

## 2024-07-29 DIAGNOSIS — R00.2 PALPITATIONS: Primary | ICD-10-CM

## 2024-07-29 DIAGNOSIS — R00.2 HEART PALPITATIONS: ICD-10-CM

## 2024-07-29 DIAGNOSIS — G71.11 MYOTONIC MUSCULAR DYSTROPHY (MULTI): ICD-10-CM

## 2024-07-29 DIAGNOSIS — R79.89 ELEVATED TROPONIN LEVEL: ICD-10-CM

## 2024-07-29 DIAGNOSIS — R00.2 PALPITATIONS: ICD-10-CM

## 2024-07-29 DIAGNOSIS — R79.89 ELEVATED TROPONIN LEVEL NOT DUE MYOCARDIAL INFARCTION: ICD-10-CM

## 2024-07-29 LAB — BODY SURFACE AREA: 1.62 M2

## 2024-07-29 PROCEDURE — 93247 EXT ECG>7D<15D SCAN A/R: CPT

## 2024-07-29 PROCEDURE — 99214 OFFICE O/P EST MOD 30 MIN: CPT | Performed by: INTERNAL MEDICINE

## 2024-07-29 PROCEDURE — 99204 OFFICE O/P NEW MOD 45 MIN: CPT | Performed by: INTERNAL MEDICINE

## 2024-07-29 PROCEDURE — 93005 ELECTROCARDIOGRAM TRACING: CPT | Performed by: INTERNAL MEDICINE

## 2024-07-29 PROCEDURE — 3008F BODY MASS INDEX DOCD: CPT | Performed by: INTERNAL MEDICINE

## 2024-07-29 PROCEDURE — 1036F TOBACCO NON-USER: CPT | Performed by: INTERNAL MEDICINE

## 2024-07-29 ASSESSMENT — ENCOUNTER SYMPTOMS
DEPRESSION: 0
LOSS OF SENSATION IN FEET: 0
OCCASIONAL FEELINGS OF UNSTEADINESS: 0

## 2024-07-29 ASSESSMENT — PAIN SCALES - GENERAL: PAINLEVEL: 0-NO PAIN

## 2024-07-29 NOTE — ASSESSMENT & PLAN NOTE
We will obtain a transthoracic echocardiogram for structural evaluation including ejection fraction, assessment of regional wall motion abnormalities or valvular disease, and further evaluation of hemodynamics.  --Repeat high-sensitivity cardiac troponin, check CK level  --Low threshold for cardiac MRI for infiltration assessment

## 2024-07-29 NOTE — ASSESSMENT & PLAN NOTE
Given the patient's symptoms and in order to further evaluate possible arrhythmias, we will plan to perform an event monitor.    We will obtain a transthoracic echocardiogram for structural evaluation including ejection fraction, assessment of regional wall motion abnormalities or valvular disease, and further evaluation of hemodynamics.

## 2024-07-29 NOTE — PROGRESS NOTES
Referred by Dr. Watson for New Patient Visit, Palpitations, and Shortness of Breath (Pt c/o palpitations and SOB with exertion)     HPI:    Anthony Arshad is a 29 y.o. male with pertinent history of  has a past medical history of Insomnia (12/11/2023), Irregular heart beat, and Myotonic muscular dystrophy (Multi). who presents to cardiology clinic to establish care.     He was recently seen at [facility] ER with complaints of heart palpitations for several days prior to presentation. EKG showed normal sinus rhythm without any changes compared to previous. Patient's potassium was low at 3.2, which was replaced. He was also given IV fluids and stated that he felt improved and was denying any palpitations after treatment. Interestingly, his troponin came back elevated at 38, and repeat was further elevated at 45. Discussed this with cardiology on-call who felt that since the patient was asymptomatic, that he could be followed up outpatient for echocardiogram and Holter monitor.     7/ 29/2024 he presents to establish care-->.  He is seen in the presence of his father.  Permission was granted discussed medical care in his father's presence.  He reports that he has ongoing heart palpitations.  Despite recommendations, he was not prescribed an event monitor, nor echocardiogram.  Presents today to follow-up his ER presentation.  He describes his palpitations as occurring almost daily.  They are self terminating.  He is unclear on how long they last, but he knows at least less than 1 hour in total duration each day.      No exacerbating or relieving factors.  Patient denies chest pain and angina.  Pt denies orthopnea, and paroxysmal nocturnal dyspnea.  Pt denies worsening lower extremity edema.  Pt Rep[orts palpitations but denies syncope.  No recent falls.  No fever or chills.  No cough.  No change in bowel or bladder habits.  No sick contacts.  No recent travel.    12 point review of systems including  "(Constitutional, Eyes, ENMT, Respiratory, Cardiac, Gastrointestinal, Neurological, Psychiatric, and Hematologic) was performed and is otherwise negative.    Past medical history reviewed:   has a past medical history of Insomnia (12/11/2023), Irregular heart beat, and Myotonic muscular dystrophy (Multi).    Past surgical history reviewed:   has a past surgical history that includes Cholecystectomy.    Social history reviewed:   reports that he has never smoked. He has never used smokeless tobacco. He reports current alcohol use. He reports current drug use. Frequency: 7.00 times per week. Drug: Marijuana.     Family history reviewed:    Family History   Problem Relation Name Age of Onset    Multiple sclerosis Mother      Diabetes Father         Allergies reviewed: Peach and Tree nuts     Medications reviewed:   Current Outpatient Medications   Medication Instructions    hydrocortisone 2.5 % ointment Topical, 2 times daily, To affected areas on body for 2 weeks, then decrease to just 2-3x per week (weekends only or MWF). Repeat as needed for flares every 4-6 weeks    hydrOXYzine (ATARAX) 25 mg, oral, 3 times daily PRN    polyethylene glycol (Glycolax, Miralax) 17 gram/dose powder oral, As needed    sertraline (ZOLOFT) 25 mg, oral, Daily    tacrolimus (Protopic) 0.1 % ointment Topical, 2 times daily, To areas of skin changes in the genital area for 2-3 months        Vitals reviewed: Visit Vitals  /72 (Patient Position: Sitting)   Pulse 71       Physical Exam:   /72 (Patient Position: Sitting)   Pulse 71   Ht 1.905 m (6' 3\")   Wt 49.9 kg (110 lb)   SpO2 97%   BMI 13.75 kg/m²   Thin, frail individual  General:  Patient is awake, alert, and oriented.  Patient is in no acute distress.  HEENT:  Pupils equal and reactive.  Normocephalic.  Moist mucosa.    Neck:  No thyromegaly.  Normal Jugular Venous Pressure.  Cardiovascular:  Regular rate and rhythm.  Normal S1 and S2.  1/6 YOCASTA.  Pulmonary:  Clear to " "auscultation bilaterally.  Abdomen:  Soft. Non-tender.   Non-distended.  Positive bowel sounds.  Lower Extremities:  2+ pedal pulses. No LE edema.  Neurologic:  Cranial nerves intact.  No focal deficit.   Skin: Skin warm and dry, normal skin turgor.   Psychiatric: Normal affect.    Last Labs:  CBC -      Lab Results   Component Value Date    WBC 5.9 06/29/2024    HGB 14.9 06/29/2024    HCT 46.4 06/29/2024     06/29/2024        CMP-  Lab Results   Component Value Date    GLUCOSE 92 06/29/2024     06/29/2024    K 3.3 (L) 06/29/2024     06/29/2024    CO2 26 06/29/2024    ANIONGAP 14 06/29/2024    BUN 10 06/29/2024    CREATININE 0.62 06/29/2024    EGFR >90 06/29/2024    CALCIUM 9.5 06/29/2024    PHOS 1.0 (L) 07/06/2021    PROT 6.3 (L) 12/17/2021    ALBUMIN 4.0 12/17/2021    AST 31 12/17/2021    ALT 20 12/17/2021    ALKPHOS 56 12/17/2021    BILITOT 0.4 12/17/2021        LIPIDS-  Lab Results   Component Value Date    CHOL 136 09/20/2021    TRIG 116 09/20/2021    HDL 70.3 09/20/2021    CHHDL 1.9 09/20/2021    VLDL 23 09/20/2021        OTHERS-  No results found for: \"HGBA1C\", \"BNP\"     I personally reviewed the patient's recent vitals, labs, medications, orders, EKGs, pertinent cardiac imaging/ echocardiography and ischemic evaluations including stress testing/ cardiac catheterization.      EKG 6/29/2024        Echocardiogram 7/2021  CONCLUSIONS:   1. The left ventricular systolic function is normal with a 60-65% estimated ejection fraction.    Assessment and Plan:  Problem List Items Addressed This Visit       Elevated troponin level not due myocardial infarction    Overview         Mild flat troponin elevation in the setting of palpitations with a background of myotonic muscular dystrophy.           Current Assessment & Plan     We will obtain a transthoracic echocardiogram for structural evaluation including ejection fraction, assessment of regional wall motion abnormalities or valvular disease, and " further evaluation of hemodynamics.  --Repeat high-sensitivity cardiac troponin, check CK level  --Low threshold for cardiac MRI for infiltration assessment         Heart palpitations    Current Assessment & Plan     Given the patient's symptoms and in order to further evaluate possible arrhythmias, we will plan to perform an event monitor.    We will obtain a transthoracic echocardiogram for structural evaluation including ejection fraction, assessment of regional wall motion abnormalities or valvular disease, and further evaluation of hemodynamics.           Relevant Orders    Transthoracic echo (TTE) complete    TSH with reflex to Free T4 if abnormal    CBC and Auto Differential    Comprehensive metabolic panel    CK    Follow Up In Cardiology    Myotonic muscular dystrophy (Multi)    Relevant Orders    Transthoracic echo (TTE) complete    Troponin I, High Sensitivity    Comprehensive metabolic panel    CK    Follow Up In Cardiology     Other Visit Diagnoses       Palpitations    -  Primary    Relevant Orders    ECG 12 lead (Clinic Performed)    Transthoracic echo (TTE) complete    Holter Or Event Cardiac Monitor    TSH with reflex to Free T4 if abnormal    CBC and Auto Differential    Comprehensive metabolic panel    CK    Follow Up In Cardiology    Elevated troponin level        Relevant Orders    Transthoracic echo (TTE) complete    Troponin I, High Sensitivity    TSH with reflex to Free T4 if abnormal    CBC and Auto Differential    Comprehensive metabolic panel    CK    Follow Up In Cardiology              Please followup with me in Cardiology clinic within the next 6- 8 weeks .  Please return to clinic sooner or seek emergent care if your symptoms reoccur or worsen.    Thank you for allowing me to participate in their care.  Please feel free to call me with any further questions or concerns.        Manfred Reese DO   Division of Cardiovascular Medicine  [facility]  \[facility] [place] [facility]\""

## 2024-07-29 NOTE — PATIENT INSTRUCTIONS
"It was a pleasure seeing you today. I would like to see you back in clinic in  6-8 Weeks   months. You can call my office if questions arise between now and our next visit.     Today, we talked about your heart palpitations   -- Please try and reduce caffeine intake   -- Please try and increase the fluids that you are drinking. Shoot for at least 64 ounces per day.     -- We will perform an event monitor that you will  wear for 2 weeks It is always on. The \"Activation button\" only helps to jonathan when you feel your symptoms  Remember, we talked about doing this in 2 ways.  The first week, please do as you are doing. The second wee,k, please reduce your consumption as we discussed.     -- After your event monitor, we will check an echocardiogram to re-assess the structure and function of your heart.     Oral fluid intake should be encouraged to a target of 3 L daily and a daily salt intake of 8 to 12 g of sodium chloride    Below are some Heart Health Tips that we provide to all of our patients. I hope you fing them useful.     - If you are having problems with medications, consider looking at the following websites.   --  \"GoodRx\"   --  \"[facility]\"      - We are happy to supply written prescriptions if needed to allow you to obtain your medications from different pharmacies. Additionally, if you are having issues with mail order delivery, please let us know. We can send a limited supply of your medications to your local pharmacy.     -  We recommend you follow a heart healthy diet. Watch food labels and try not to eat more than 2,500 mg of sodium per day. Avoid foods high in salt like processed meats (lunch meats, fields, and sausage), processed foods (boxed dinners, canned soups), fried and fast foods. Monitor serving sizes and if the sodium per serving size is more than 200 mg, avoid those foods. If the sodium per serving size is between 100-200 mg, you can use those in limited quantities. Try " to choose foods where the amount of sodium per serving size is less than 100 mg. Try to eat a diet rich in fruits and vegetables, whole grains, low fat dairy products, skinless poultry and fish, nuts, beans, non-tropical vegetable oils. Limit saturated fat, trans fat, sodium, red meats, and sugar-sweetened beverages.   Limit alcohol     -The combination of a reduced-calorie diet and increased physical activity is recommended. Adults should aim to get at least 150 minutes of moderate physical activity per week (30 minutes of moderate physical activities at least 5 days per week). Examples of moderate physical activities include brisk walking, swimming, aerobic dancing, heavy gardening, jumping rope, bicycling 10 MPH or faster, tennis, hiking uphill or with a heavy backpack. Please let us know if you would like to learn more about your nutrition and calories and additional options including weight loss programs to help you reach your goal.     -If you smoke, stop smoking. If you stop smoking you can help get rid of a major source of stress to your heart. Smoking makes your heart rate and blood pressure go up and increases your risk or developing cardiovascular diseases and worsen symptoms associated with heart failure.     -Obtain a BP monitor and monitor your BP daily. Check it around the same time each day; at least 1 hour after taking your medications. Record your BP in a log and bring your log with you to your doctors appointment.     -F/u with your PCP as recommended.

## 2024-07-31 LAB
ATRIAL RATE: 71 BPM
P AXIS: 83 DEGREES
P OFFSET: 189 MS
P ONSET: 142 MS
PR INTERVAL: 154 MS
Q ONSET: 219 MS
QRS COUNT: 11 BEATS
QRS DURATION: 112 MS
QT INTERVAL: 400 MS
QTC CALCULATION(BAZETT): 434 MS
QTC FREDERICIA: 423 MS
R AXIS: 90 DEGREES
T AXIS: 84 DEGREES
T OFFSET: 419 MS
VENTRICULAR RATE: 71 BPM

## 2024-08-12 ENCOUNTER — LAB (OUTPATIENT)
Dept: LAB | Facility: LAB | Age: 29
End: 2024-08-12
Payer: COMMERCIAL

## 2024-08-12 ENCOUNTER — APPOINTMENT (OUTPATIENT)
Dept: PRIMARY CARE | Facility: CLINIC | Age: 29
End: 2024-08-12
Payer: COMMERCIAL

## 2024-08-12 VITALS
HEART RATE: 80 BPM | DIASTOLIC BLOOD PRESSURE: 72 MMHG | BODY MASS INDEX: 14.3 KG/M2 | HEIGHT: 75 IN | SYSTOLIC BLOOD PRESSURE: 127 MMHG | WEIGHT: 115 LBS

## 2024-08-12 DIAGNOSIS — F32.A ANXIETY AND DEPRESSION: ICD-10-CM

## 2024-08-12 DIAGNOSIS — Z00.00 ANNUAL PHYSICAL EXAM: Primary | ICD-10-CM

## 2024-08-12 DIAGNOSIS — G71.11 MYOTONIC MUSCULAR DYSTROPHY (MULTI): ICD-10-CM

## 2024-08-12 DIAGNOSIS — Z13.220 LIPID SCREENING: ICD-10-CM

## 2024-08-12 DIAGNOSIS — N52.9 ERECTILE DYSFUNCTION, UNSPECIFIED ERECTILE DYSFUNCTION TYPE: ICD-10-CM

## 2024-08-12 DIAGNOSIS — R00.2 PALPITATIONS: ICD-10-CM

## 2024-08-12 DIAGNOSIS — F41.9 ANXIETY AND DEPRESSION: ICD-10-CM

## 2024-08-12 DIAGNOSIS — R00.2 HEART PALPITATIONS: ICD-10-CM

## 2024-08-12 DIAGNOSIS — E55.9 VITAMIN D DEFICIENCY: ICD-10-CM

## 2024-08-12 DIAGNOSIS — R79.89 ELEVATED TROPONIN LEVEL: ICD-10-CM

## 2024-08-12 LAB
ALBUMIN SERPL BCP-MCNC: 4.7 G/DL (ref 3.4–5)
ALP SERPL-CCNC: 64 U/L (ref 33–120)
ALT SERPL W P-5'-P-CCNC: 21 U/L (ref 10–52)
ANION GAP SERPL CALC-SCNC: 11 MMOL/L (ref 10–20)
AST SERPL W P-5'-P-CCNC: 23 U/L (ref 9–39)
BILIRUB SERPL-MCNC: 0.6 MG/DL (ref 0–1.2)
BUN SERPL-MCNC: 13 MG/DL (ref 6–23)
CALCIUM SERPL-MCNC: 9.9 MG/DL (ref 8.6–10.6)
CHLORIDE SERPL-SCNC: 107 MMOL/L (ref 98–107)
CHOLEST SERPL-MCNC: 133 MG/DL (ref 0–199)
CHOLESTEROL/HDL RATIO: 2.2
CK SERPL-CCNC: 535 U/L (ref 0–325)
CO2 SERPL-SCNC: 32 MMOL/L (ref 21–32)
CREAT SERPL-MCNC: 0.6 MG/DL (ref 0.5–1.3)
EGFRCR SERPLBLD CKD-EPI 2021: >90 ML/MIN/1.73M*2
GLUCOSE SERPL-MCNC: 88 MG/DL (ref 74–99)
HDLC SERPL-MCNC: 61.8 MG/DL
LDLC SERPL CALC-MCNC: 50 MG/DL
NON HDL CHOLESTEROL: 71 MG/DL (ref 0–149)
POTASSIUM SERPL-SCNC: 3.7 MMOL/L (ref 3.5–5.3)
PROT SERPL-MCNC: 6.7 G/DL (ref 6.4–8.2)
SODIUM SERPL-SCNC: 146 MMOL/L (ref 136–145)
TRIGL SERPL-MCNC: 105 MG/DL (ref 0–149)
VLDL: 21 MG/DL (ref 0–40)

## 2024-08-12 PROCEDURE — 3008F BODY MASS INDEX DOCD: CPT | Performed by: NURSE PRACTITIONER

## 2024-08-12 PROCEDURE — 80053 COMPREHEN METABOLIC PANEL: CPT

## 2024-08-12 PROCEDURE — 82550 ASSAY OF CK (CPK): CPT

## 2024-08-12 PROCEDURE — 99395 PREV VISIT EST AGE 18-39: CPT | Performed by: NURSE PRACTITIONER

## 2024-08-12 PROCEDURE — 80061 LIPID PANEL: CPT

## 2024-08-12 PROCEDURE — 36415 COLL VENOUS BLD VENIPUNCTURE: CPT

## 2024-08-12 PROCEDURE — 1036F TOBACCO NON-USER: CPT | Performed by: NURSE PRACTITIONER

## 2024-08-12 PROCEDURE — 85025 COMPLETE CBC W/AUTO DIFF WBC: CPT

## 2024-08-12 PROCEDURE — 82306 VITAMIN D 25 HYDROXY: CPT

## 2024-08-12 PROCEDURE — 84443 ASSAY THYROID STIM HORMONE: CPT

## 2024-08-12 PROCEDURE — 99213 OFFICE O/P EST LOW 20 MIN: CPT | Performed by: NURSE PRACTITIONER

## 2024-08-12 ASSESSMENT — PATIENT HEALTH QUESTIONNAIRE - PHQ9
1. LITTLE INTEREST OR PLEASURE IN DOING THINGS: NOT AT ALL
SUM OF ALL RESPONSES TO PHQ9 QUESTIONS 1 AND 2: 0
2. FEELING DOWN, DEPRESSED OR HOPELESS: NOT AT ALL

## 2024-08-12 ASSESSMENT — ENCOUNTER SYMPTOMS
LOSS OF SENSATION IN FEET: 0
DEPRESSION: 0
OCCASIONAL FEELINGS OF UNSTEADINESS: 0

## 2024-08-12 NOTE — PROGRESS NOTES
Reason for Visit: Annual Physical Exam    HPI:  presents to the office today for a physical exam. Requesting to start BlueChew for erectile dysfunction. Stated hasn't been performing as he feels he should. Unable to keep erection.    Heart palpitations have improved. History of myotonic muscular dystrophy.      Active Problem List  Patient Active Problem List   Diagnosis    Anxiety and depression    Chronic constipation    Myotonic muscular dystrophy (Multi)    Pill dysphagia    Vitamin D deficiency    Bilateral arm weakness    Shortness of breath    Heart palpitations    Elevated troponin level not due myocardial infarction       Comprehensive Medical/Surgical/Social/Family History  Past Medical History:   Diagnosis Date    Insomnia 12/11/2023    Irregular heart beat     Myotonic muscular dystrophy (Multi)      Past Surgical History:   Procedure Laterality Date    CHOLECYSTECTOMY       Social History     Social History Narrative    Not on file         Allergies and Medications  Peach and Tree nuts  Current Outpatient Medications on File Prior to Visit   Medication Sig Dispense Refill    hydrocortisone 2.5 % ointment Apply topically 2 times a day. To affected areas on body for 2 weeks, then decrease to just 2-3x per week (weekends only or MWF). Repeat as needed for flares every 4-6 weeks 30 g 3    hydrOXYzine (Atarax) 10 mg/5 mL syrup Take 12.5 mL (25 mg) by mouth 3 times a day as needed for anxiety for up to 10 days. 240 mL 1    polyethylene glycol (Glycolax, Miralax) 17 gram/dose powder Take by mouth if needed.      sertraline (Zoloft) 25 mg tablet Take 1 tablet (25 mg) by mouth once daily. 90 tablet 1    tacrolimus (Protopic) 0.1 % ointment Apply topically 2 times a day. To areas of skin changes in the genital area for 2-3 months 30 g 1     No current facility-administered medications on file prior to visit.         ROS otherwise negative aside from what was mentioned above in HPI.    Vitals  /72    "Pulse 80   Ht 1.905 m (6' 3\")   Wt 52.2 kg (115 lb)   BMI 14.37 kg/m²   Body mass index is 14.37 kg/m².  Physical Exam  Gen: Alert, NAD  HEENT:  PERRLA, EOMI, conjunctiva and sclera normal in appearance. External auditory canals/TMs normal; Oral cavity and posterior pharynx without lesions/exudate  Neck:  Supple with FROM; No masses/nodes palpable; Thyroid nontender and without nodules; No XANDER  Respiratory:  Lungs CTAB  Cardiovascular:  Heart RRR. No M/R/G. Peripheral pulses equal bilaterally  Abdomen:  Soft, nontender, BS present throughout; No R/G/R; No HSM or masses palpated  Extremities:  FROM all extremities; Muscle strength grossly normal with good tone  Neuro:  CN II-XII intact; Reflexes 2+/2+; Gross motor and sensory intact  Skin:  No suspicious lesions present  Breast: No masses, skin lesions or nipple discharges, no axillary lymphadenopathy    Assessment and Plan:  Problem List Items Addressed This Visit       Anxiety and depression     Other Visit Diagnoses       Annual physical exam    -  Primary    Erectile dysfunction, unspecified erectile dysfunction type        Relevant Orders    Referral to Urology        1) Anxiety and depression: stable. Not currently on antidepressant medications. We discussed you seeking medical help if you feel hopeless, helpless, thoughts of harming self,  through the nearest ER or crisis center. We discussed that you may telephone me anytime if you need to talk. [facility] telephone number is [phone].      2) Heart palpitations: improved. Continue follow-up with cardiology.    3) Erectile dysfunction: referral placed for urology.  "

## 2024-08-13 LAB
25(OH)D3 SERPL-MCNC: 9 NG/ML (ref 30–100)
BASOPHILS # BLD AUTO: 0.05 X10*3/UL (ref 0–0.1)
BASOPHILS NFR BLD AUTO: 1 %
EOSINOPHIL # BLD AUTO: 0.54 X10*3/UL (ref 0–0.7)
EOSINOPHIL NFR BLD AUTO: 10.4 %
ERYTHROCYTE [DISTWIDTH] IN BLOOD BY AUTOMATED COUNT: 12.4 % (ref 11.5–14.5)
HCT VFR BLD AUTO: 44.5 % (ref 41–52)
HGB BLD-MCNC: 13.7 G/DL (ref 13.5–17.5)
IMM GRANULOCYTES # BLD AUTO: 0.01 X10*3/UL (ref 0–0.7)
IMM GRANULOCYTES NFR BLD AUTO: 0.2 % (ref 0–0.9)
LYMPHOCYTES # BLD AUTO: 2.36 X10*3/UL (ref 1.2–4.8)
LYMPHOCYTES NFR BLD AUTO: 45.6 %
MCH RBC QN AUTO: 28.7 PG (ref 26–34)
MCHC RBC AUTO-ENTMCNC: 30.8 G/DL (ref 32–36)
MCV RBC AUTO: 93 FL (ref 80–100)
MONOCYTES # BLD AUTO: 0.3 X10*3/UL (ref 0.1–1)
MONOCYTES NFR BLD AUTO: 5.8 %
NEUTROPHILS # BLD AUTO: 1.92 X10*3/UL (ref 1.2–7.7)
NEUTROPHILS NFR BLD AUTO: 37 %
NRBC BLD-RTO: 0 /100 WBCS (ref 0–0)
PLATELET # BLD AUTO: 194 X10*3/UL (ref 150–450)
RBC # BLD AUTO: 4.78 X10*6/UL (ref 4.5–5.9)
TSH SERPL-ACNC: 0.98 MIU/L (ref 0.44–3.98)
WBC # BLD AUTO: 5.2 X10*3/UL (ref 4.4–11.3)

## 2024-08-18 DIAGNOSIS — E55.9 VITAMIN D DEFICIENCY: Primary | ICD-10-CM

## 2024-08-18 RX ORDER — ERGOCALCIFEROL 1.25 MG/1
50000 CAPSULE ORAL
Qty: 13 CAPSULE | Refills: 1 | Status: SHIPPED | OUTPATIENT
Start: 2024-08-18 | End: 2024-11-10

## 2024-08-19 ENCOUNTER — HOSPITAL ENCOUNTER (OUTPATIENT)
Dept: CARDIOLOGY | Facility: HOSPITAL | Age: 29
Discharge: HOME | End: 2024-08-19
Payer: COMMERCIAL

## 2024-08-19 DIAGNOSIS — R79.89 ELEVATED TROPONIN LEVEL: ICD-10-CM

## 2024-08-19 DIAGNOSIS — R00.2 PALPITATIONS: ICD-10-CM

## 2024-08-19 DIAGNOSIS — R00.2 HEART PALPITATIONS: ICD-10-CM

## 2024-08-19 DIAGNOSIS — G71.11 MYOTONIC MUSCULAR DYSTROPHY (MULTI): ICD-10-CM

## 2024-08-19 PROCEDURE — 93306 TTE W/DOPPLER COMPLETE: CPT

## 2024-08-19 PROCEDURE — 93306 TTE W/DOPPLER COMPLETE: CPT | Performed by: INTERNAL MEDICINE

## 2024-08-20 LAB
AORTIC VALVE MEAN GRADIENT: 2.2 MMHG
AORTIC VALVE PEAK VELOCITY: 1.07 M/S
AV PEAK GRADIENT: 4.6 MMHG
AVA (PEAK VEL): 3.35 CM2
AVA (VTI): 4.13 CM2
EJECTION FRACTION APICAL 4 CHAMBER: 54
EJECTION FRACTION: 54 %
LEFT ATRIUM VOLUME AREA LENGTH INDEX BSA: 12.4 ML/M2
LEFT VENTRICLE INTERNAL DIMENSION DIASTOLE: 4.5 CM (ref 3.5–6)
LEFT VENTRICULAR OUTFLOW TRACT DIAMETER: 2.89 CM
MITRAL VALVE E/A RATIO: 4.14
RIGHT VENTRICLE PEAK SYSTOLIC PRESSURE: 24.1 MMHG

## 2024-09-06 ENCOUNTER — DOCUMENTATION (OUTPATIENT)
Dept: OCCUPATIONAL THERAPY | Facility: CLINIC | Age: 29
End: 2024-09-06
Payer: COMMERCIAL

## 2024-09-06 NOTE — PROGRESS NOTES
Occupational Therapy    Discharge Summary    Name: Anthony Arshad  MRN: [id number]  : 1995  Date: 24    Discharge Summary: OT    Discharge Information: Date of discharge 24, Date of last visit 6/10/24, Date of evaluation 4/15/24, Number of attended visits 3, Referred by REGLA Jimenes, and Referred for OT eval and treat; myotonic muscular dystrophy      Rehab Discharge Reason:   Patient's last attended visit was 6/10/24. Patient has not scheduled any more follows up. Will discharge the chart at this time.

## 2024-09-09 ENCOUNTER — ANCILLARY PROCEDURE (OUTPATIENT)
Dept: CARDIOLOGY | Facility: CLINIC | Age: 29
End: 2024-09-09
Payer: COMMERCIAL

## 2024-09-09 ENCOUNTER — OFFICE VISIT (OUTPATIENT)
Dept: CARDIOLOGY | Facility: CLINIC | Age: 29
End: 2024-09-09
Payer: COMMERCIAL

## 2024-09-09 VITALS
OXYGEN SATURATION: 93 % | WEIGHT: 111.8 LBS | DIASTOLIC BLOOD PRESSURE: 71 MMHG | BODY MASS INDEX: 13.9 KG/M2 | HEART RATE: 59 BPM | RESPIRATION RATE: 16 BRPM | SYSTOLIC BLOOD PRESSURE: 113 MMHG | HEIGHT: 75 IN

## 2024-09-09 DIAGNOSIS — R00.2 HEART PALPITATIONS: Primary | ICD-10-CM

## 2024-09-09 PROCEDURE — 93005 ELECTROCARDIOGRAM TRACING: CPT

## 2024-09-09 PROCEDURE — 99213 OFFICE O/P EST LOW 20 MIN: CPT | Mod: 25 | Performed by: INTERNAL MEDICINE

## 2024-09-09 PROCEDURE — 93010 ELECTROCARDIOGRAM REPORT: CPT | Performed by: INTERNAL MEDICINE

## 2024-09-09 PROCEDURE — 3008F BODY MASS INDEX DOCD: CPT | Performed by: INTERNAL MEDICINE

## 2024-09-09 PROCEDURE — 99213 OFFICE O/P EST LOW 20 MIN: CPT | Performed by: INTERNAL MEDICINE

## 2024-09-09 PROCEDURE — 1036F TOBACCO NON-USER: CPT | Performed by: INTERNAL MEDICINE

## 2024-09-09 ASSESSMENT — ENCOUNTER SYMPTOMS: DEPRESSION: 0

## 2024-09-09 NOTE — PROGRESS NOTES
Referred by Dr. Go ref. provider found for Follow-up and Palpitations     HPI:    Anthony Arshad is a 29 y.o. male with pertinent history of  has a past medical history of Insomnia (12/11/2023), Irregular heart beat, and Myotonic muscular dystrophy (Multi). who presents to cardiology clinic to establish care.     He was recently seen at [facility] ER with complaints of heart palpitations for several days prior to presentation. EKG showed normal sinus rhythm without any changes compared to previous. Patient's potassium was low at 3.2, which was replaced. He was also given IV fluids and stated that he felt improved and was denying any palpitations after treatment. Interestingly, his troponin came back elevated at 38, and repeat was further elevated at 45. Discussed this with cardiology on-call who felt that since the patient was asymptomatic, that he could be followed up outpatient for echocardiogram and Holter monitor.     7/ 29/2024 he presents to establish care-->.  He is seen in the presence of his father.  Permission was granted discussed medical care in his father's presence.  He reports that he has ongoing heart palpitations.  Despite recommendations, he was not prescribed an event monitor, nor echocardiogram.  Presents today to follow-up his ER presentation.  He describes his palpitations as occurring almost daily.  They are self terminating.  He is unclear on how long they last, but he knows at least less than 1 hour in total duration each day.    9/9/2024 -- He returns for cardiovascular follow-up and is seen in the presence of his father.  Since he was last seen, he reports that he is overall done well.  He has tried to adjust some of his diet and is drinking more water.  He notes that the heart palpitations appear to be dramatically improved.  We did discuss the results of his echocardiogram which showed a structurally normal heart with preserved ejection fraction, normal diastology no  "significant chamber dilation and no valvular dysfunction that would account for the degree of heart palpitations that he is experience.  We went over the results of his event monitor as well.      No exacerbating or relieving factors.  Patient denies chest pain and angina.  Pt denies orthopnea, and paroxysmal nocturnal dyspnea.  Pt denies worsening lower extremity edema.  Pt Rep[orts palpitations but denies syncope.  No recent falls.  No fever or chills.  No cough.  No change in bowel or bladder habits.  No sick contacts.  No recent travel.    12 point review of systems including (Constitutional, Eyes, ENMT, Respiratory, Cardiac, Gastrointestinal, Neurological, Psychiatric, and Hematologic) was performed and is otherwise negative.    Past medical history reviewed:   has a past medical history of Insomnia (12/11/2023), Irregular heart beat, and Myotonic muscular dystrophy (Multi).    Past surgical history reviewed:   has a past surgical history that includes Cholecystectomy.    Social history reviewed:   reports that he has never smoked. He has never used smokeless tobacco. He reports current alcohol use. He reports current drug use. Frequency: 7.00 times per week. Drug: Marijuana.     Family history reviewed:    Family History   Problem Relation Name Age of Onset    Multiple sclerosis Mother      Diabetes Father         Allergies reviewed: Peach and Tree nuts     Medications reviewed:   Current Outpatient Medications   Medication Instructions    polyethylene glycol (Glycolax, Miralax) 17 gram/dose powder oral, As needed        Vitals reviewed: Visit Vitals  /71 (BP Location: Right arm, Patient Position: Sitting, BP Cuff Size: Small adult)   Pulse 59   Resp 16       Physical Exam:   /71 (BP Location: Right arm, Patient Position: Sitting, BP Cuff Size: Small adult)   Pulse 59   Resp 16   Ht 1.905 m (6' 3\")   Wt 50.7 kg (111 lb 12.8 oz)   SpO2 93%   BMI 13.97 kg/m²   Thin, frail individual  General:  " "Patient is awake, alert, and oriented.  Patient is in no acute distress.  HEENT:  Pupils equal and reactive.  Normocephalic.  Moist mucosa.    Neck:  No thyromegaly.  Normal Jugular Venous Pressure.  Cardiovascular:  Regular rate and rhythm.  Normal S1 and S2.  1/6 YOCASTA.  Pulmonary:  Clear to auscultation bilaterally.  Abdomen:  Soft. Non-tender.   Non-distended.  Positive bowel sounds.  Lower Extremities:  2+ pedal pulses. No LE edema.  Neurologic:  Cranial nerves intact.  No focal deficit.   Skin: Skin warm and dry, normal skin turgor.   Psychiatric: Normal affect.    Last Labs:  CBC -      Lab Results   Component Value Date    WBC 5.2 08/12/2024    HGB 13.7 08/12/2024    HCT 44.5 08/12/2024     08/12/2024        CMP-  Lab Results   Component Value Date    GLUCOSE 88 08/12/2024     (H) 08/12/2024    K 3.7 08/12/2024     08/12/2024    CO2 32 08/12/2024    ANIONGAP 11 08/12/2024    BUN 13 08/12/2024    CREATININE 0.60 08/12/2024    EGFR >90 08/12/2024    CALCIUM 9.9 08/12/2024    PHOS 1.0 (L) 07/06/2021    PROT 6.7 08/12/2024    ALBUMIN 4.7 08/12/2024    AST 23 08/12/2024    ALT 21 08/12/2024    ALKPHOS 64 08/12/2024    BILITOT 0.6 08/12/2024        LIPIDS-  Lab Results   Component Value Date    CHOL 133 08/12/2024    TRIG 105 08/12/2024    HDL 61.8 08/12/2024    CHHDL 2.2 08/12/2024    VLDL 21 08/12/2024        OTHERS-  No results found for: \"HGBA1C\", \"BNP\"     I personally reviewed the patient's recent vitals, labs, medications, orders, EKGs, pertinent cardiac imaging/ echocardiography and ischemic evaluations including stress testing/ cardiac catheterization.    Echocardiogram 08/2024  CONCLUSIONS:   1. Left ventricular ejection fraction is normal, calculated by Caceres's biplane at 54%.   2. There is normal right ventricular global systolic function.   3. RVSP within normal limits.    EKG 6/29/2024        Echocardiogram 7/2021  CONCLUSIONS:   1. The left ventricular systolic function is normal " with a 60-65% estimated ejection fraction.    Assessment and Plan:  Problem List Items Addressed This Visit       Heart palpitations - Primary    Overview     9/9 --> Resolved          Current Assessment & Plan     Minimal PVC /PAC burden and none in sustained runs on event monitor   -- Supportive observation   -- Beta blockers or CCB would be reasonable if her has recurrent symptoms               Please followup with me in Cardiology clinic within the next year or as needed. .  Please return to clinic sooner or seek emergent care if your symptoms reoccur or worsen.    Thank you for allowing me to participate in their care.  Please feel free to call me with any further questions or concerns.        Manfred Reese DO   Division of Cardiovascular Medicine  [facility], [facility]

## 2024-09-09 NOTE — ASSESSMENT & PLAN NOTE
Minimal PVC /PAC burden and none in sustained runs on event monitor   -- Supportive observation   -- Beta blockers or CCB would be reasonable if her has recurrent symptoms

## 2024-09-11 LAB
ATRIAL RATE: 59 BPM
ATRIAL RATE: 71 BPM
BODY SURFACE AREA: 1.62 M2
P AXIS: 83 DEGREES
P AXIS: 86 DEGREES
P OFFSET: 189 MS
P OFFSET: 190 MS
P ONSET: 136 MS
P ONSET: 142 MS
PR INTERVAL: 154 MS
PR INTERVAL: 164 MS
Q ONSET: 218 MS
Q ONSET: 219 MS
QRS COUNT: 10 BEATS
QRS COUNT: 11 BEATS
QRS DURATION: 112 MS
QRS DURATION: 114 MS
QT INTERVAL: 400 MS
QT INTERVAL: 402 MS
QTC CALCULATION(BAZETT): 397 MS
QTC CALCULATION(BAZETT): 434 MS
QTC FREDERICIA: 399 MS
QTC FREDERICIA: 423 MS
R AXIS: 88 DEGREES
R AXIS: 90 DEGREES
T AXIS: 83 DEGREES
T AXIS: 84 DEGREES
T OFFSET: 419 MS
T OFFSET: 419 MS
VENTRICULAR RATE: 59 BPM
VENTRICULAR RATE: 71 BPM

## 2024-09-12 ENCOUNTER — APPOINTMENT (OUTPATIENT)
Dept: UROLOGY | Facility: CLINIC | Age: 29
End: 2024-09-12
Payer: COMMERCIAL

## 2024-09-16 ENCOUNTER — OFFICE VISIT (OUTPATIENT)
Dept: PULMONOLOGY | Facility: CLINIC | Age: 29
End: 2024-09-16
Payer: COMMERCIAL

## 2024-09-16 VITALS
HEIGHT: 75 IN | SYSTOLIC BLOOD PRESSURE: 108 MMHG | HEART RATE: 65 BPM | BODY MASS INDEX: 13.93 KG/M2 | DIASTOLIC BLOOD PRESSURE: 68 MMHG | WEIGHT: 112 LBS | OXYGEN SATURATION: 97 % | TEMPERATURE: 97.8 F | RESPIRATION RATE: 18 BRPM

## 2024-09-16 DIAGNOSIS — R06.02 SHORTNESS OF BREATH: ICD-10-CM

## 2024-09-16 DIAGNOSIS — G71.11 MYOTONIC MUSCULAR DYSTROPHY (MULTI): Primary | ICD-10-CM

## 2024-09-16 PROCEDURE — 3008F BODY MASS INDEX DOCD: CPT | Performed by: INTERNAL MEDICINE

## 2024-09-16 PROCEDURE — 99214 OFFICE O/P EST MOD 30 MIN: CPT | Performed by: INTERNAL MEDICINE

## 2024-09-16 PROCEDURE — 1036F TOBACCO NON-USER: CPT | Performed by: INTERNAL MEDICINE

## 2024-09-16 ASSESSMENT — PAIN SCALES - GENERAL: PAINLEVEL: 0-NO PAIN

## 2024-09-16 NOTE — PROGRESS NOTES
Department of Medicine  Division of Pulmonary, Critical Care, and Sleep Medicine  Follow Up  [facility], [address]    Anthony Arshad is a 29 y.o. male who returns as a follow up for shortness of breath. Last visit was on 5/6/2024.    Physician HPI (9/16/2024):  Since last visit we had him perform PFT which was notable for air trapping and low MIP/MEP. ABG sample clotted before it got to the lab. He never heard back from [employer] to have his home overnight oximetry testing performed. He went to the emergency room in June for palpitations.  He was found to have an elevated troponin for which she is undergoing outpatient cardiology workup.  Since that episode, he denies any episodes of shortness of breath.  He does state that occasionally when he feels anxious or panicky, his breathing gets worse.  He is still smoking marijuana though he states he has significantly cut back.    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 He states approximately 2 to 3 months ago he has noticed episodes of shortness of breath lasting approximately 1 hour.  These episodes have been occurring every 1 to 2 days or so.  There are no triggers for these episodes of dyspnea.  He denies any cough, sputum production, wheezing, recent illnesses.  He does smoke marijuana daily, and sometimes, these episodes of shortness of breath occur after smoking.  He states he is able to walk 1 to 2 miles on flat ground without any issues.  He has been admitted 1 time for respiratory issues, however, this was anxiety related.  At that time, his pH on ABG was 7.65 and his pCO2 is 23.  He has never had any formal pulmonary function testing done before.  He denies any nighttime symptoms of dyspnea.  He denies any snoring. He was diagnosed with muscular dystrophy around the age of 11.     I have personally reviewed PMH, PSH, Family History in the HISTORY tab of this chart, and unless noted  "above are not pertinent to the presenting complaint.  I have personally reviewed Social History as provided in the electronic medical record.    Immunization History:  Immunization History   Administered Date(s) Administered    Flu vaccine (IIV4), preservative free *Check age/dose* 2023    HPV 9-valent vaccine (GARDASIL 9) 2016, 2018    HPV, Quadrivalent 2014    Hepatitis B vaccine, 19 yrs and under (RECOMBIVAX, ENGERIX) 2014    Influenza, injectable, quadrivalent 2018, 2020, 2023    Novel Influenza-H1N1-09, nasal 2009    Tdap vaccine, age 7 year and older (BOOSTRIX, ADACEL) 2009, 2023       Current Medications:  Current Outpatient Medications   Medication Instructions    polyethylene glycol (Glycolax, Miralax) 17 gram/dose powder oral, As needed        Drug Allergies/Intolerances:  Allergies   Allergen Reactions    Peach Other     Throat itching     Tree Nuts Other     Peanut butter        Review of Systems:  Review of Systems     All other review of systems are negative and/or non-contributory.    Physical Examination:  Vitals:    24 1448   BP: 108/68   BP Location: Right arm   Patient Position: Sitting   Pulse: 65   Resp: 18   Temp: 36.6 °C (97.8 °F)   TempSrc: Temporal   SpO2: 97%   Weight: 50.8 kg (112 lb)   Height: 1.905 m (6' 3\")          GEN: thin frame. No respiratory difficulties  CV: RRR, no m/g/r  LUNGS: good effort, clear bilaterally, no w/r/r  EXT: thin    Exacerbation History     N/A    Pulmonary Function Test Results     2024:  FVC: 3.45 L (63%)  FEV1: 2.46 L (54%)  FEV1/FVC: 71  OGG90-71%: 1.73L/sec (37%)  T.44 L (104%)  RV: 218%  DLCO: 106%  MIP: -18 (expected -109 to -57)   MEP: +25 (expected 94 to 151)  ABG sample not able to be analyzed due to transport delay    O2 Requirements     6MWT: N/A    CAT score     N/A    Peak Flow and ACT     N/A    Chest Radiograph     2024: hyperinflated lungs    Chest CT Scan " "    None    Labs     Lab Results   Component Value Date    WBC 5.2 08/12/2024    HGB 13.7 08/12/2024    HCT 44.5 08/12/2024    MCV 93 08/12/2024     08/12/2024     No results found for: \"BNP\"  Lab Results   Component Value Date    EOSABS 0.54 08/12/2024       Echocardiogram     No results found for this or any previous visit from the past 365 days.       ASSESSMENT & PLAN     Problem List Items Addressed This Visit       Myotonic muscular dystrophy (Multi) - Primary    Relevant Orders    Overnight Pulse Oximetry Test    Shortness of breath        SUMMARY:  29 y.o. male with muscular dystrophy. PFT results significant for decreased muscle strength.  Unfortunately, ABG sample clotted so assessment for CO2 retention was not performed.  Reordering overnight oximetry testing to see if he is hypoventilating overnight.  If results are abnormal, we will then refer to sleep medicine.  He may be a candidate for NIV in the future depending upon overnight oximetry testing and he has chronically retaining CO2.    Follow-up: As needed if results of oximetry testing abnormal.      Suresh Chavez DO  Staff Physician - Pulmonary & Critical Care  09/16/24 2:47 PM  Office number: [phone]   Fax number:  [phone]       "

## 2024-10-05 DIAGNOSIS — E55.9 VITAMIN D DEFICIENCY: Primary | ICD-10-CM

## 2024-10-05 RX ORDER — CHOLECALCIFEROL (VITAMIN D3) 50 MCG
50 TABLET ORAL WEEKLY
Qty: 13 TABLET | Refills: 1 | Status: SHIPPED | OUTPATIENT
Start: 2024-10-05

## 2024-10-07 ENCOUNTER — APPOINTMENT (OUTPATIENT)
Dept: PRIMARY CARE | Facility: CLINIC | Age: 29
End: 2024-10-07
Payer: COMMERCIAL

## 2024-10-07 VITALS
DIASTOLIC BLOOD PRESSURE: 81 MMHG | SYSTOLIC BLOOD PRESSURE: 129 MMHG | HEART RATE: 49 BPM | HEIGHT: 75 IN | BODY MASS INDEX: 14.05 KG/M2 | WEIGHT: 113 LBS

## 2024-10-07 DIAGNOSIS — G71.11 MYOTONIC MUSCULAR DYSTROPHY (MULTI): ICD-10-CM

## 2024-10-07 DIAGNOSIS — K62.5 RECTAL BLEED: Primary | ICD-10-CM

## 2024-10-07 DIAGNOSIS — E55.9 VITAMIN D DEFICIENCY: ICD-10-CM

## 2024-10-07 DIAGNOSIS — H60.501 ACUTE NON-INFECTIVE OTITIS EXTERNA OF RIGHT EAR, UNSPECIFIED TYPE: ICD-10-CM

## 2024-10-07 PROCEDURE — 99214 OFFICE O/P EST MOD 30 MIN: CPT | Performed by: NURSE PRACTITIONER

## 2024-10-07 PROCEDURE — 3008F BODY MASS INDEX DOCD: CPT | Performed by: NURSE PRACTITIONER

## 2024-10-07 RX ORDER — FLUTICASONE PROPIONATE 50 MCG
1 SPRAY, SUSPENSION (ML) NASAL 2 TIMES DAILY
Qty: 16 G | Refills: 0 | Status: SHIPPED | OUTPATIENT
Start: 2024-10-07

## 2024-10-07 RX ORDER — ERGOCALCIFEROL 1.25 MG/1
50000 CAPSULE ORAL
Qty: 13 CAPSULE | Refills: 0 | Status: SHIPPED | OUTPATIENT
Start: 2024-10-13 | End: 2025-01-12

## 2024-10-07 ASSESSMENT — ENCOUNTER SYMPTOMS
DEPRESSION: 0
HEMATOCHEZIA: 1
LOSS OF SENSATION IN FEET: 0
OCCASIONAL FEELINGS OF UNSTEADINESS: 0

## 2024-10-07 NOTE — PROGRESS NOTES
"Subjective   Patient ID: Anthony Arshad is a 29 y.o. male who presents for Ear Fullness and Rectal Bleeding.     presents to the office today, accompanied by his father, with concerns of episode of blood in stool 2 weeks ago. Stated blood was bright red. He denied constipation. No pain or further blood in stool.      Doesn't eat much. Snacks on chips, junk food. Father reported that  doesn't eat \"healthy\".    Ear Fullness     Rectal Bleeding         Review of Systems   Gastrointestinal:  Positive for blood in stool and hematochezia.   All other systems reviewed and are negative.      Objective   /81   Pulse (!) 49   Ht 1.905 m (6' 3\")   Wt 51.3 kg (113 lb)   BMI 14.12 kg/m²     Physical Exam  Constitutional:       Appearance: Normal appearance.   HENT:      Head: Normocephalic and atraumatic.      Right Ear: Tympanic membrane, ear canal and external ear normal.      Left Ear: Tympanic membrane, ear canal and external ear normal.      Ears:      Comments: Right ear with hazy cone of light. No fluid or erythema.      Nose: Nose normal.      Mouth/Throat:      Mouth: Mucous membranes are moist.      Pharynx: Oropharynx is clear.   Eyes:      Extraocular Movements: Extraocular movements intact.      Conjunctiva/sclera: Conjunctivae normal.      Pupils: Pupils are equal, round, and reactive to light.   Cardiovascular:      Rate and Rhythm: Normal rate and regular rhythm.   Pulmonary:      Effort: Pulmonary effort is normal.      Breath sounds: Normal breath sounds.   Abdominal:      General: Abdomen is flat. Bowel sounds are normal.      Palpations: Abdomen is soft.   Musculoskeletal:         General: Normal range of motion.      Cervical back: Normal range of motion.   Skin:     General: Skin is warm and dry.   Neurological:      General: No focal deficit present.      Mental Status: He is alert and oriented to person, place, and time. Mental status is at baseline.   Psychiatric:         Mood " and Affect: Mood normal.         Behavior: Behavior normal.         Thought Content: Thought content normal.         Judgment: Judgment normal.         Assessment/Plan   Problem List Items Addressed This Visit             ICD-10-CM    Myotonic muscular dystrophy (Multi) G71.11    Relevant Orders    Referral to Primary Care     Other Visit Diagnoses         Codes    Rectal bleed    -  Primary K62.5    Relevant Orders    Referral to Gastroenterology    Acute non-infective otitis externa of right ear, unspecified type     H60.501    Relevant Medications    fluticasone (Flonase) 50 mcg/actuation nasal spray          1) Rectal bleed: recommend you follow-up with gastroenterology.    2) Otitis externa: you were prescribed Flonase nasal spray. If no improvement within 7-10 days, please reach out to office.

## 2024-10-10 ASSESSMENT — ENCOUNTER SYMPTOMS: BLOOD IN STOOL: 1

## 2024-10-21 ENCOUNTER — HOSPITAL ENCOUNTER (EMERGENCY)
Facility: HOSPITAL | Age: 29
Discharge: HOME | End: 2024-10-21
Payer: COMMERCIAL

## 2024-10-21 ENCOUNTER — APPOINTMENT (OUTPATIENT)
Dept: UROLOGY | Facility: CLINIC | Age: 29
End: 2024-10-21
Payer: COMMERCIAL

## 2024-10-21 ENCOUNTER — APPOINTMENT (OUTPATIENT)
Dept: RADIOLOGY | Facility: HOSPITAL | Age: 29
End: 2024-10-21
Payer: COMMERCIAL

## 2024-10-21 VITALS
HEART RATE: 93 BPM | WEIGHT: 114 LBS | RESPIRATION RATE: 18 BRPM | SYSTOLIC BLOOD PRESSURE: 108 MMHG | TEMPERATURE: 98.3 F | OXYGEN SATURATION: 97 % | HEIGHT: 75 IN | DIASTOLIC BLOOD PRESSURE: 75 MMHG | BODY MASS INDEX: 14.17 KG/M2

## 2024-10-21 DIAGNOSIS — M72.2 PLANTAR FASCIITIS, LEFT: Primary | ICD-10-CM

## 2024-10-21 PROCEDURE — 73630 X-RAY EXAM OF FOOT: CPT | Mod: LT

## 2024-10-21 PROCEDURE — 73630 X-RAY EXAM OF FOOT: CPT | Mod: LEFT SIDE | Performed by: RADIOLOGY

## 2024-10-21 PROCEDURE — 99283 EMERGENCY DEPT VISIT LOW MDM: CPT

## 2024-10-21 RX ORDER — NAPROXEN 500 MG/1
500 TABLET ORAL
Qty: 20 TABLET | Refills: 0 | Status: SHIPPED | OUTPATIENT
Start: 2024-10-21 | End: 2024-10-31

## 2024-10-21 ASSESSMENT — COLUMBIA-SUICIDE SEVERITY RATING SCALE - C-SSRS
2. HAVE YOU ACTUALLY HAD ANY THOUGHTS OF KILLING YOURSELF?: NO
6. HAVE YOU EVER DONE ANYTHING, STARTED TO DO ANYTHING, OR PREPARED TO DO ANYTHING TO END YOUR LIFE?: NO
1. IN THE PAST MONTH, HAVE YOU WISHED YOU WERE DEAD OR WISHED YOU COULD GO TO SLEEP AND NOT WAKE UP?: NO

## 2024-10-21 ASSESSMENT — PAIN DESCRIPTION - PAIN TYPE: TYPE: ACUTE PAIN

## 2024-10-21 ASSESSMENT — PAIN DESCRIPTION - ORIENTATION: ORIENTATION: LEFT

## 2024-10-21 ASSESSMENT — PAIN SCALES - GENERAL: PAINLEVEL_OUTOF10: 7

## 2024-10-21 ASSESSMENT — PAIN DESCRIPTION - LOCATION: LOCATION: FOOT

## 2024-10-21 ASSESSMENT — PAIN - FUNCTIONAL ASSESSMENT: PAIN_FUNCTIONAL_ASSESSMENT: 0-10

## 2024-10-21 NOTE — ED PROVIDER NOTES
HPI   Chief Complaint   Patient presents with    Foot Pain       29-year-old male presents today with left foot pain that occurred while he was at a store.  He is always walking on flip-flops.  He states that there were some broken bottles on the floor and was wondering if he had foreign body.  He denies paresthesia.  He denies limited range of motion he denies inability to ambulate.  He has no other cause for concern or complaint today and vital signs are normal and stable in triage.  Patient has medical history of muscular dystrophy type II ADHD and anxiety.      History provided by:  Patient and parent   used: No            Patient History   Past Medical History:   Diagnosis Date    Insomnia 12/11/2023    Irregular heart beat     Myotonic muscular dystrophy (Multi)      Past Surgical History:   Procedure Laterality Date    CHOLECYSTECTOMY       Family History   Problem Relation Name Age of Onset    Multiple sclerosis Mother      Diabetes Father       Social History     Tobacco Use    Smoking status: Never    Smokeless tobacco: Never   Substance Use Topics    Alcohol use: Yes     Comment: socially    Drug use: Yes     Frequency: 7.0 times per week     Types: Marijuana     Comment: everyday smoker       Physical Exam   ED Triage Vitals [10/21/24 1205]   Temperature Heart Rate Respirations BP   36.8 °C (98.3 °F) 93 18 108/75      Pulse Ox Temp src Heart Rate Source Patient Position   97 % -- -- --      BP Location FiO2 (%)     -- --       Physical Exam  Constitutional:       Appearance: Normal appearance.   Cardiovascular:      Rate and Rhythm: Normal rate and regular rhythm.      Pulses: Normal pulses.      Heart sounds: Normal heart sounds.   Pulmonary:      Effort: Pulmonary effort is normal.      Breath sounds: Normal breath sounds.   Abdominal:      General: Abdomen is flat.      Palpations: Abdomen is soft.   Musculoskeletal:         General: Tenderness present. Normal range of motion.       Cervical back: Normal range of motion and neck supple.   Skin:     General: Skin is warm.      Capillary Refill: Capillary refill takes less than 2 seconds.   Neurological:      General: No focal deficit present.      Mental Status: He is alert and oriented to person, place, and time.   Psychiatric:         Mood and Affect: Mood normal.         Behavior: Behavior normal.           ED Course & MDM   Diagnoses as of 10/21/24 1256   Plantar fasciitis, left                 No data recorded     Julien Coma Scale Score: 15 (10/21/24 1210 : Tiara Pham RN)                           Medical Decision Making  Pedal and posterior tibial pulse 2/2.  Cap refill less than 2 seconds.  Full range of motion of foot and he can ambulate without difficulty.  X-ray was negative for acute findings.  I believe patient is experiencing plantar fasciitis from walking around in his flip-flops.  I recommended father get more supportive shoes.  I showed him how to stretch the foot every morning with a towel at least 10 times before he gets out of bed.  Referred to podiatry for outpatient follow-up.  Return precautions reviewed.        Procedure  Procedures     Jaylan Iverson, APRN-CNP  10/21/24 [id number]

## 2024-10-21 NOTE — ED TRIAGE NOTES
Pt. Arrived to the ED via private car for c/o left foot pain. Pt. States that for the last week he has been having pain in the bottom of his foot. Pt. States that it feels like he steeped on a foreign object but he is unsure. Pt. Is unsure if he has had a tetanus shot in the last 10 years

## 2024-11-12 ENCOUNTER — APPOINTMENT (OUTPATIENT)
Dept: DERMATOLOGY | Facility: CLINIC | Age: 29
End: 2024-11-12
Payer: COMMERCIAL

## 2024-11-18 ENCOUNTER — APPOINTMENT (OUTPATIENT)
Dept: GASTROENTEROLOGY | Facility: CLINIC | Age: 29
End: 2024-11-18
Payer: COMMERCIAL

## 2024-11-21 ENCOUNTER — APPOINTMENT (OUTPATIENT)
Dept: PRIMARY CARE | Facility: CLINIC | Age: 29
End: 2024-11-21
Payer: COMMERCIAL

## 2024-11-21 VITALS
SYSTOLIC BLOOD PRESSURE: 123 MMHG | BODY MASS INDEX: 14.17 KG/M2 | HEIGHT: 75 IN | DIASTOLIC BLOOD PRESSURE: 72 MMHG | WEIGHT: 114 LBS

## 2024-11-21 DIAGNOSIS — G71.11 MYOTONIC MUSCULAR DYSTROPHY (MULTI): ICD-10-CM

## 2024-11-21 DIAGNOSIS — E55.9 VITAMIN D DEFICIENCY: Primary | ICD-10-CM

## 2024-11-21 DIAGNOSIS — F41.9 ANXIETY AND DEPRESSION: ICD-10-CM

## 2024-11-21 DIAGNOSIS — E44.1 MILD PROTEIN-CALORIE MALNUTRITION (MULTI): ICD-10-CM

## 2024-11-21 DIAGNOSIS — K59.09 CHRONIC CONSTIPATION: ICD-10-CM

## 2024-11-21 DIAGNOSIS — F32.A ANXIETY AND DEPRESSION: ICD-10-CM

## 2024-11-21 PROBLEM — R06.02 SHORTNESS OF BREATH: Status: RESOLVED | Noted: 2024-05-06 | Resolved: 2024-11-21

## 2024-11-21 RX ORDER — ASCORBIC ACID 125 MG
50 TABLET,CHEWABLE ORAL DAILY
Qty: 180 EACH | Refills: 3 | Status: SHIPPED | OUTPATIENT
Start: 2024-11-21

## 2024-11-21 RX ORDER — HYDROXYZINE HYDROCHLORIDE 10 MG/5ML
0.5 SYRUP ORAL 3 TIMES DAILY PRN
Qty: 473 ML | Refills: 1 | Status: SHIPPED | OUTPATIENT
Start: 2024-11-21 | End: 2025-05-20

## 2024-11-21 RX ORDER — POLYETHYLENE GLYCOL 3350 17 G/17G
17 POWDER, FOR SOLUTION ORAL DAILY
Qty: 90 PACKET | Refills: 3 | Status: SHIPPED | OUTPATIENT
Start: 2024-11-21 | End: 2025-11-16

## 2024-11-21 ASSESSMENT — PATIENT HEALTH QUESTIONNAIRE - PHQ9
1. LITTLE INTEREST OR PLEASURE IN DOING THINGS: NOT AT ALL
2. FEELING DOWN, DEPRESSED OR HOPELESS: NOT AT ALL
SUM OF ALL RESPONSES TO PHQ9 QUESTIONS 1 AND 2: 0

## 2024-11-21 ASSESSMENT — LIFESTYLE VARIABLES: HOW MANY STANDARD DRINKS CONTAINING ALCOHOL DO YOU HAVE ON A TYPICAL DAY: PATIENT DOES NOT DRINK

## 2024-11-21 NOTE — PROGRESS NOTES
"Subjective   Patient ID: Anthony Arshad is a 29 y.o. male who presents for Clinic new (Declined flu shot today).  HPI  29 year old male with PMH of myotonic muscular dystrophy presents today to establish care.     Overall doing well. Poor appetite, states that he eats 1-2 meals a day, minimal snacks. \"If I'm not hungry, I just don't eat\". Does see GI, missed most recent follow up.     Struggles intermittently with constipation, relieved by miralax, takes PRN    Vit D was quite low on last check, not taking supplement as he cannot swallow pills and rx for pill was sent.     All systems have been reviewed and are negative for complaint other than those mentioned in the HPI.     Objective   /72   Ht 1.905 m (6' 3\")   Wt 51.7 kg (114 lb)   BMI 14.25 kg/m²    Physical Exam  Constitutional:       General: He is awake.      Appearance: Normal appearance. He is underweight.   HENT:      Head: Normocephalic and atraumatic.   Eyes:      Extraocular Movements: Extraocular movements intact.      Pupils: Pupils are equal, round, and reactive to light.   Cardiovascular:      Rate and Rhythm: Normal rate and regular rhythm.      Heart sounds: S1 normal and S2 normal. No murmur heard.  Pulmonary:      Effort: Pulmonary effort is normal.      Breath sounds: Normal breath sounds.   Musculoskeletal:      Cervical back: Normal range of motion and neck supple.      Right lower leg: No edema.      Left lower leg: No edema.   Skin:     General: Skin is warm and dry.   Neurological:      General: No focal deficit present.      Mental Status: He is alert and oriented to person, place, and time.   Psychiatric:         Mood and Affect: Mood and affect normal.         Behavior: Behavior normal. Behavior is cooperative.         Thought Content: Thought content normal.         Judgment: Judgment normal.      was seen today for clinic new.  Diagnoses and all orders for this visit:  Vitamin D deficiency (Primary)  -     RX " changed to gummies, patient states he can tolerate gummies and take well.   - cholecalciferol, vitamin D3, 25 mcg (1,000 unit) chewable gummy; Take 2 each (50 mcg) by mouth once daily.  -     Referral to Primary Care; Future  Mild protein-calorie malnutrition (Multi)  -    Underweight. Discussed adding a boost or ensure once daily.  -  Referral to Nutrition Services; Future  -     Referral to Primary Care; Future  Chronic constipation  -    Stable. Continue current management.    -  polyethylene glycol (Glycolax, Miralax) 17 gram packet; Take 17 g by mouth once daily. Mix 1 cap (17g) into 8 ounces of fluid.  -     Referral to Primary Care; Future  Anxiety and depression  -    intermittent panic attacks, was prescribed SSRI in the past but didn't take it.   - Not interested in daily medication, doesn't feel he needs it at this time, wishes to continue hydroxyzine PRN. Knows what situations are triggering for him and takes it prior to those situations.   -  hydrOXYzine (Atarax) 10 mg/5 mL syrup; Take 12.5 mL (25 mg) by mouth 3 times a day as needed for anxiety.  -     Referral to Primary Care; Future  Myotonic muscular dystrophy (Multi)  -     Currently not seeing a provider to help manage this  - Recommend seeing neurology given some intermittent swallow issues, physical deconditioning, increasing weakness, poor appetite. Referral placed.  -  In the past saw PT/OT with good improvement in bothersome symptoms, wishes to establish with PMR.   - Referral to Neurology; Future  -     Referral to Physical Medicine Rehab; Future  -     Referral to Physical Therapy; Future  -     Referral to Primary Care; Future    Follow up in 3 months. Patient informed this provider will be leaving . The patient was given phone numbers and information to schedule follow up with a new PCP.

## 2024-12-03 ENCOUNTER — PATIENT OUTREACH (OUTPATIENT)
Dept: CARE COORDINATION | Facility: CLINIC | Age: 29
End: 2024-12-03
Payer: COMMERCIAL

## 2024-12-03 NOTE — CARE PLAN
12/3/24: 1st RD call attempt, Left VM. Please call [phone] if interested in scheduling appointment. YENY

## 2024-12-10 ENCOUNTER — PATIENT OUTREACH (OUTPATIENT)
Dept: CARE COORDINATION | Facility: CLINIC | Age: 29
End: 2024-12-10
Payer: COMMERCIAL

## 2024-12-20 ENCOUNTER — OFFICE VISIT (OUTPATIENT)
Dept: GASTROENTEROLOGY | Facility: CLINIC | Age: 29
End: 2024-12-20
Payer: COMMERCIAL

## 2024-12-20 VITALS
WEIGHT: 115 LBS | DIASTOLIC BLOOD PRESSURE: 64 MMHG | BODY MASS INDEX: 14.3 KG/M2 | SYSTOLIC BLOOD PRESSURE: 108 MMHG | HEART RATE: 68 BPM | TEMPERATURE: 97.7 F | HEIGHT: 75 IN

## 2024-12-20 DIAGNOSIS — K92.1 HEMATOCHEZIA: Primary | ICD-10-CM

## 2024-12-20 DIAGNOSIS — K59.09 CHRONIC CONSTIPATION: ICD-10-CM

## 2024-12-20 DIAGNOSIS — K62.5 RECTAL BLEED: ICD-10-CM

## 2024-12-20 PROCEDURE — 99214 OFFICE O/P EST MOD 30 MIN: CPT | Performed by: NURSE PRACTITIONER

## 2024-12-20 PROCEDURE — 1036F TOBACCO NON-USER: CPT | Performed by: NURSE PRACTITIONER

## 2024-12-20 PROCEDURE — 3008F BODY MASS INDEX DOCD: CPT | Performed by: NURSE PRACTITIONER

## 2024-12-20 ASSESSMENT — ENCOUNTER SYMPTOMS
EYE PAIN: 0
LIGHT-HEADEDNESS: 0
DYSURIA: 0
DIAPHORESIS: 0
PALPITATIONS: 0
FEVER: 0
FREQUENCY: 0
SORE THROAT: 0
PHOTOPHOBIA: 0
NERVOUS/ANXIOUS: 0
DIZZINESS: 0
ARTHRALGIAS: 0
BACK PAIN: 0
WHEEZING: 0
HEADACHES: 0
CHILLS: 0
FLANK PAIN: 0
HEMATURIA: 0
FATIGUE: 0
WEAKNESS: 0
MYALGIAS: 0
NUMBNESS: 0
COUGH: 0
JOINT SWELLING: 0
AGITATION: 0
ADENOPATHY: 0
SHORTNESS OF BREATH: 0
HALLUCINATIONS: 0

## 2024-12-20 NOTE — PATIENT INSTRUCTIONS
Thanks for coming to the GI clinic.     I would like you to get a colonoscopy. Please call [phone] to schedule.   Please read all of the instructions 7 days before your colonoscopy.   You will need to take ONLY clear liquids the ENTIRE day before your procedure. These include (clear fruit juices, soda, Gatorade, broth, jello and coffee/tea) Avoid red and purple drinks. No cream or milk in the coffee.   You will need to take a bowel preparation.   You will also need a .       Use polyethylene glycol (Miralax) 17 grams once daily.     Try to adhere to a high fiber diet.      Try to drink 64 ounces of water per day.      Follow up with me in clinic 3 weeks after completion of the colonoscopy.

## 2024-12-20 NOTE — PROGRESS NOTES
Subjective   Patient ID: Anthony Arshad is a 29 y.o. male who presents for Follow-up.    This is a 29 year old AAM with history of daily marijuana use, anxiety, myotonic muscular dystrophy, and chronic constipation who is presenting to the GI clinic for follow up. Last seen in the GI clinic on 2/26/24.     History per pt, father, and review of EMR    Pt is accompanied to this visit by his father.     Reports constipation is under fair control. Recently needed to taking Ex Lax senna chocolate laxative, a stool softener,  suppositories, magnesium citrate, and Dulcolax chewables. Using Miralax on occasion. Does not drink much water. Does not consume much dietary fiber. Father has been trying to get him to drink Pedialyte.     He had another episode of rectal bleeding at the end of October 2024. Saw bright red blood coating his stool at that time.     Denies unintentional weight loss, abdominal pain, vomiting, diarrhea, rectal pain, hematemesis, and melena.     He's never been able to swallow pills secondary to muscular dystrophy.       Lab Results   Component Value Date    WBC 5.2 08/12/2024    HGB 13.7 08/12/2024    HCT 44.5 08/12/2024    MCV 93 08/12/2024     08/12/2024        Review of Systems   Constitutional:  Negative for chills, diaphoresis, fatigue and fever.   HENT:  Negative for congestion, ear pain, hearing loss, sneezing and sore throat.    Eyes:  Negative for photophobia, pain and visual disturbance.   Respiratory:  Negative for cough, shortness of breath and wheezing.    Cardiovascular:  Negative for chest pain, palpitations and leg swelling.   Endocrine: Negative for cold intolerance and heat intolerance.   Genitourinary:  Negative for dysuria, flank pain, frequency and hematuria.   Musculoskeletal:  Negative for arthralgias, back pain, gait problem, joint swelling and myalgias.   Skin:  Negative for rash.   Neurological:  Negative for dizziness, syncope, weakness, light-headedness, numbness and  "headaches.   Hematological:  Negative for adenopathy.   Psychiatric/Behavioral:  Negative for agitation and hallucinations. The patient is not nervous/anxious.        Allergies   Allergen Reactions    Peach Other     Throat itching     Tree Nuts Other     Peanut butter     Current Outpatient Medications   Medication Sig Dispense Refill    hydrOXYzine (Atarax) 10 mg/5 mL syrup Take 12.5 mL (25 mg) by mouth 3 times a day as needed for anxiety. 473 mL 1    polyethylene glycol (Glycolax, Miralax) 17 gram packet Take 17 g by mouth once daily. Mix 1 cap (17g) into 8 ounces of fluid. 90 packet 3    cholecalciferol, vitamin D3, 25 mcg (1,000 unit) chewable gummy Take 2 each (50 mcg) by mouth once daily. (Patient not taking: Reported on 12/20/2024) 180 each 3    fluticasone (Flonase) 50 mcg/actuation nasal spray Administer 1 spray into each nostril 2 times a day. Shake gently. Before first use, prime pump. After use, clean tip and replace cap. (Patient not taking: Reported on 12/20/2024) 16 g 0     No current facility-administered medications for this visit.        Objective     /64   Pulse 68   Temp 36.5 °C (97.7 °F) (Temporal)   Ht 1.905 m (6' 3\")   Wt 52.2 kg (115 lb)   BMI 14.37 kg/m²     Physical Exam  Constitutional:       General: He is not in acute distress.  HENT:      Head: Normocephalic and atraumatic.   Eyes:      Conjunctiva/sclera: Conjunctivae normal.   Cardiovascular:      Rate and Rhythm: Normal rate and regular rhythm.      Heart sounds: No murmur heard.     No gallop.   Pulmonary:      Effort: Pulmonary effort is normal.      Breath sounds: Normal breath sounds.   Abdominal:      General: Bowel sounds are normal. There is no distension.      Tenderness: There is no abdominal tenderness. There is no guarding.   Musculoskeletal:         General: No swelling or deformity. Normal range of motion.      Cervical back: Normal range of motion. No rigidity.   Skin:     General: Skin is warm and dry.      " Coloration: Skin is not jaundiced.      Findings: No lesion or rash.   Neurological:      General: No focal deficit present.      Mental Status: He is alert and oriented to person, place, and time.   Psychiatric:         Mood and Affect: Mood normal.         Assessment/Plan   Problem List Items Addressed This Visit       Chronic constipation     Other Visit Diagnoses       Hematochezia    -  Primary    Relevant Orders    Colonoscopy Diagnostic    Rectal bleed               Painless hematochezia: consider hemorrhoid bleeding, polyps, and colorectal cancer. Reassuringly there is no anemia per recent labs.   - will proceed with colonoscopy   - OTC Miralax Gatorade split bowel prep (pt declined Golytely prep as he does not believe he can handle the volume of prep)     2. Chronic constipation:   - recommend Miralax 17 grams once daily (instead of PRN)  - advise high fiber diet; reading materials provided  - recommend drinking 6-8 glasses of water per day    3. Follow up:  - return to clinic 3 weeks after the completion of colonoscopy

## 2025-03-17 ENCOUNTER — APPOINTMENT (OUTPATIENT)
Dept: OCCUPATIONAL THERAPY | Facility: CLINIC | Age: 30
End: 2025-03-17
Payer: COMMERCIAL

## 2025-03-17 ENCOUNTER — DOCUMENTATION (OUTPATIENT)
Dept: OCCUPATIONAL THERAPY | Facility: CLINIC | Age: 30
End: 2025-03-17
Payer: COMMERCIAL
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
## 2025-03-31 ENCOUNTER — EVALUATION (OUTPATIENT)
Dept: OCCUPATIONAL THERAPY | Facility: CLINIC | Age: 30
End: 2025-03-31
Payer: COMMERCIAL

## 2025-03-31 DIAGNOSIS — R29.898 WEAKNESS OF BOTH UPPER EXTREMITIES: Primary | ICD-10-CM

## 2025-03-31 DIAGNOSIS — G71.11 MYOTONIC MUSCULAR DYSTROPHY (MULTI): ICD-10-CM

## 2025-03-31 PROCEDURE — 97535 SELF CARE MNGMENT TRAINING: CPT | Mod: GO

## 2025-03-31 PROCEDURE — 97110 THERAPEUTIC EXERCISES: CPT | Mod: GO

## 2025-03-31 PROCEDURE — 97166 OT EVAL MOD COMPLEX 45 MIN: CPT | Mod: GO

## 2025-03-31 ASSESSMENT — PAIN SCALES - GENERAL: PAINLEVEL_OUTOF10: 0 - NO PAIN

## 2025-03-31 ASSESSMENT — ENCOUNTER SYMPTOMS
OCCASIONAL FEELINGS OF UNSTEADINESS: 0
LOSS OF SENSATION IN FEET: 0
DEPRESSION: 0

## 2025-03-31 ASSESSMENT — PAIN - FUNCTIONAL ASSESSMENT: PAIN_FUNCTIONAL_ASSESSMENT: 0-10

## 2025-03-31 ASSESSMENT — ACTIVITIES OF DAILY LIVING (ADL): HOME_MANAGEMENT_TIME_ENTRY: 15

## 2025-03-31 NOTE — PROGRESS NOTES
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 Patient has demonstrated increased BUE weakness over the past year since last seen by me. We discussed that the POC will focus on re-establishing a HEP for BUE's, as well as continue to educate on AE and modifications to maintain patient's independence with ADLs. Patient and patient's father in agreement with plan.    Plan:   OT POC to include: neuro re-ed, ADLs, AE/DME training, manual therapy, therapeutic exercise, therapeutic activity, HEP    1x  a week for 5-6 weeks  Rehab potential: Good   Patient in agreement with plan     Subjective   Patient agreeable to OT evaluation. Accompanied by dad.  Patient was seen by me in the past for 3 visits from April-2024 for same diagnosis.  Patient reports noticing getting weaker in the last year. Patient reports hasn't done his exercises because he forgets to do them.     Current Problem:  1. Weakness of both upper extremities  Follow Up In Occupational Therapy      2. Myotonic muscular dystrophy (Multi)  Referral to Occupational Therapy    Follow Up In Occupational Therapy        General:      General  Reason for Referral: OT eval and treat  Referred By:  (Dang Betancourt, APRN-CNP)  Preferred Learning Style: verbal, visual,  "written  Precautions:  Precautions  ELADI Fall Risk Score (The score of 4 or more indicates an increased risk of falling): 2  Vital Signs:   Not taken   Pain Assessment:  Pain Assessment  Pain Assessment: 0-10  0-10 (Numeric) Pain Score: 0 - No pain  Work History:  Not working   Prior Level of Function:  Mod I  Roles/Routines:  Plays video games  Patient Goal:  \"Maintain and get back on a routine\"  Personal Factors that my impact Care:   Progressive disease  Objective   Neuro:  Observation:  R handed   Palpation:  Severe atrophy noted in B supraspinatus, infraspinatus, 1/2 finger sublux in B shoulders  B scapular winging  ROM  RUE:  R abduction: 0-45 degrees  R external rotation: 0-20 degrees  LUE:  L abduction: 0-40 degrees  L external rotation: 0-20  Strength:  RUE:   Shoulder elevation: 3+/5  shoulder flexion: 0/5  shoulder abduction: 1/5   External rotation: 1/5  Elbow flexion:  3/5  Wrist extension:  4- /5  : 10#  LUE:   Shoulder elevation  shoulder flexion:  0/5  shoulder abduction: 1/5   External rotation:  2-/5  Elbow flexion:   3+/5  Wrist extension:  4+ /5  :  7 #  Coordination:  Fine motor coordination intact  Sensation:  Denies parathesias in BUE's   Tone:  1 MAS in B hand flexors  Tremor   None noted  Outcome Measures:  OT Adult Other Outcome Measures  Other Outcome Measures: R: 21 seconds L: 22 seconds    OP EDUCATION:/Treatment:   Education on AE for ADLs: dressing stick to assist with donning jackets, long handled sponge for bathing, as well as educating on sitting and putting head down to reach hair with hands to wash.  Given another piece of dycem to assist with opening containers.  Educated patient on and completed B wrist stretches, 5x 5 second holds,  and towel slides on table top: flexion and abduction to B shoulders.  All given in handout form.  Patient also given handouts on putty exercises for home.    Goals:  Active       OT Goals       HEP       Start:  03/31/25    Expected End:  " 06/04/25       Will  re-establish a home program to include UE stretching/ROM and strengthening, as well as educate patient on AE/DME and modifications to maintain function for ADL/IADL tasks.

## 2025-04-07 ENCOUNTER — TREATMENT (OUTPATIENT)
Dept: OCCUPATIONAL THERAPY | Facility: CLINIC | Age: 30
End: 2025-04-07
Payer: COMMERCIAL

## 2025-04-07 DIAGNOSIS — R29.898 WEAKNESS OF BOTH UPPER EXTREMITIES: Primary | ICD-10-CM

## 2025-04-07 DIAGNOSIS — G71.11 MYOTONIC MUSCULAR DYSTROPHY (MULTI): ICD-10-CM

## 2025-04-07 PROCEDURE — 97110 THERAPEUTIC EXERCISES: CPT | Mod: GO

## 2025-04-07 ASSESSMENT — PAIN - FUNCTIONAL ASSESSMENT: PAIN_FUNCTIONAL_ASSESSMENT: 0-10

## 2025-04-07 ASSESSMENT — PAIN SCALES - GENERAL: PAINLEVEL_OUTOF10: 0 - NO PAIN

## 2025-04-07 NOTE — PROGRESS NOTES
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 Pt. demonstrated good carryover of exercises.    Plan:   Continue with skilled OT POC     Subjective   Patient agreeable to treatment session. No new issues to report.     Precautions:   STEADI: 2    Pain Assessment:  Pain Assessment  Pain Assessment: 0-10  0-10 (Numeric) Pain Score: 0 - No pain    Objective    Therapeutic Exercise: 40 mins  -Towel slides sitting at table 20x: shoulder flexion  -Cane exercises in standing AAROM 2 sets of 10: extension, internal rotation, external rotation  -B shoulder isometrics against doorway 3x 30 second holds: flexion, extension, external rotation, internal rotation    OP EDUCATION:   Continue with HEP  Given new handouts for cane exercises and shoulder isometrics     Goals:  Active       OT Goals       HEP       Start:  25    Expected End:  25       Will  re-establish a home program to include UE stretching/ROM and strengthening, as well as educate patient on AE/DME and modifications to maintain function for ADL/IADL tasks.

## 2025-04-14 ENCOUNTER — TREATMENT (OUTPATIENT)
Dept: OCCUPATIONAL THERAPY | Facility: CLINIC | Age: 30
End: 2025-04-14
Payer: COMMERCIAL

## 2025-04-14 DIAGNOSIS — R29.898 WEAKNESS OF BOTH UPPER EXTREMITIES: Primary | ICD-10-CM

## 2025-04-14 DIAGNOSIS — G71.11 MYOTONIC MUSCULAR DYSTROPHY (MULTI): ICD-10-CM

## 2025-04-14 PROCEDURE — 97110 THERAPEUTIC EXERCISES: CPT | Mod: GO

## 2025-04-14 ASSESSMENT — PAIN - FUNCTIONAL ASSESSMENT: PAIN_FUNCTIONAL_ASSESSMENT: 0-10

## 2025-04-14 ASSESSMENT — PAIN SCALES - GENERAL: PAINLEVEL_OUTOF10: 0 - NO PAIN

## 2025-04-14 NOTE — PROGRESS NOTES
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 We discussed making sure patient breaks up exercises and takes breaks at home (ex. Doing sets of 10 vs 30 at one time) due to patient reporting fatigue.     Plan:   Continue with skilled OT POC     Subjective   Patient agreeable to treatment session. No new issues to report.     Precautions:   STEADI: 2    Pain Assessment:  Pain Assessment  Pain Assessment: 0-10  0-10 (Numeric) Pain Score: 0 - No pain    Objective    Therapeutic Exercise: 40 mins  -Towel slides sitting at table 15x each muscle movement each UE: shoulder flexion, shoulder abduction  -Yellow flex bar to B forearms/wrists 10x each: pronation, supination, wrist flexion, wrist extension, turning end of flex bar like a cap clockwise/counter clockwise.  -Cane exercises in standing AAROM 2 sets of 10: extension, internal rotation, external rotation, elbow flexion/extension  -B shoulder isometrics against doorway 3x 10 second holds: flexion, extension, external rotation, internal rotation    OP EDUCATION:   Continue with HEP      Goals:  Active       OT Goals       HEP       Start:  25    Expected End:  25       Will  re-establish a home program to include UE stretching/ROM and strengthening, as well as educate patient on AE/DME and modifications to maintain function for ADL/IADL tasks.

## 2025-04-21 ENCOUNTER — TREATMENT (OUTPATIENT)
Dept: OCCUPATIONAL THERAPY | Facility: CLINIC | Age: 30
End: 2025-04-21
Payer: COMMERCIAL

## 2025-04-21 DIAGNOSIS — R29.898 WEAKNESS OF BOTH UPPER EXTREMITIES: Primary | ICD-10-CM

## 2025-04-21 DIAGNOSIS — G71.11 MYOTONIC MUSCULAR DYSTROPHY (MULTI): ICD-10-CM

## 2025-04-21 PROCEDURE — 97110 THERAPEUTIC EXERCISES: CPT | Mod: GO

## 2025-04-21 ASSESSMENT — PAIN SCALES - GENERAL: PAINLEVEL_OUTOF10: 0 - NO PAIN

## 2025-04-21 ASSESSMENT — PAIN - FUNCTIONAL ASSESSMENT: PAIN_FUNCTIONAL_ASSESSMENT: 0-10

## 2025-04-28 ENCOUNTER — TREATMENT (OUTPATIENT)
Dept: OCCUPATIONAL THERAPY | Facility: CLINIC | Age: 30
End: 2025-04-28
Payer: COMMERCIAL

## 2025-04-28 DIAGNOSIS — R29.898 WEAKNESS OF BOTH UPPER EXTREMITIES: Primary | ICD-10-CM

## 2025-04-28 DIAGNOSIS — G71.11 MYOTONIC MUSCULAR DYSTROPHY (MULTI): ICD-10-CM

## 2025-04-28 PROCEDURE — 97110 THERAPEUTIC EXERCISES: CPT | Mod: GO

## 2025-04-28 ASSESSMENT — PAIN - FUNCTIONAL ASSESSMENT: PAIN_FUNCTIONAL_ASSESSMENT: 0-10

## 2025-04-28 ASSESSMENT — PAIN SCALES - GENERAL: PAINLEVEL_OUTOF10: 0 - NO PAIN

## 2025-04-28 NOTE — PROGRESS NOTES
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 Patient reports being independent with HEP. All of patient and patient's fathers questions answered.    Plan:   Goals met. Patient independent with HEP. Discharge.  Patient and patient's father in agreement with plan.    Subjective   Patient agreeable to treatment session. No new issues to report.     Precautions:   STEADI: 2    Pain Assessment:  Pain Assessment  Pain Assessment: 0-10  0-10 (Numeric) Pain Score: 0 - No pain    Objective    Therapeutic Exercise: 40 mins  -Towel slides sitting at table 15x each muscle movement each UE: shoulder flexion, shoulder abduction  -B shoulder isometrics against doorway 3x 10 second holds: flexion, extension, external rotation, internal rotation   -Yellow flex bar to B forearms/wrists 10x each: pronation, supination, wrist flexion, wrist extension, turning end of flex bar like a cap clockwise/counter clockwise.   -B elbow flexion with 2# hand weights 2 sets of 10    OP EDUCATION:   Continue with HEP after discharge      Goals:  Resolved       OT Goals       HEP (Met)       Start:  25    Expected End:  25    Resolved:  25    Will  re-establish a home program to include UE stretching/ROM and strengthening, as well as educate patient on AE/DME and modifications to maintain function for ADL/IADL tasks.

## 2025-05-05 ENCOUNTER — APPOINTMENT (OUTPATIENT)
Dept: OCCUPATIONAL THERAPY | Facility: CLINIC | Age: 30
End: 2025-05-05
Payer: COMMERCIAL

## 2025-05-28 ENCOUNTER — APPOINTMENT (OUTPATIENT)
Dept: PRIMARY CARE | Facility: CLINIC | Age: 30
End: 2025-05-28
Payer: COMMERCIAL

## 2025-05-28 VITALS
WEIGHT: 113 LBS | DIASTOLIC BLOOD PRESSURE: 58 MMHG | BODY MASS INDEX: 14.05 KG/M2 | SYSTOLIC BLOOD PRESSURE: 92 MMHG | HEIGHT: 75 IN | HEART RATE: 55 BPM

## 2025-05-28 DIAGNOSIS — F41.9 ANXIETY AND DEPRESSION: ICD-10-CM

## 2025-05-28 DIAGNOSIS — G71.11 MYOTONIC MUSCULAR DYSTROPHY (MULTI): Primary | ICD-10-CM

## 2025-05-28 DIAGNOSIS — F32.A ANXIETY AND DEPRESSION: ICD-10-CM

## 2025-05-28 RX ORDER — HYDROXYZINE HYDROCHLORIDE 10 MG/5ML
0.5 SOLUTION ORAL 3 TIMES DAILY PRN
Qty: 473 ML | Refills: 2 | Status: SHIPPED | OUTPATIENT
Start: 2025-05-28 | End: 2025-11-24

## 2025-05-28 ASSESSMENT — ENCOUNTER SYMPTOMS
DEPRESSION: 0
OCCASIONAL FEELINGS OF UNSTEADINESS: 0
LOSS OF SENSATION IN FEET: 0

## 2025-05-28 ASSESSMENT — PATIENT HEALTH QUESTIONNAIRE - PHQ9
2. FEELING DOWN, DEPRESSED OR HOPELESS: NOT AT ALL
SUM OF ALL RESPONSES TO PHQ9 QUESTIONS 1 AND 2: 0
1. LITTLE INTEREST OR PLEASURE IN DOING THINGS: NOT AT ALL

## 2025-05-28 NOTE — PROGRESS NOTES
"Patient is here to est care and presents for general check up.          Chief Complaint:  No chief complaint on file.  History Of Present Illness:   Anthony Arshad is a 29 y.o. male       Here with his father today.    Atarax liquid refill for anxiety.             Healthcare team:  - MMD type 2. Upper extremity. Occupational therapy.   - GI  - dermatology  - cardiology  - neurology referral  - pulmonology          Tobacco  Packs per day/years/quit date- none                Mood: average     Sleep: awful         Review of Systems (BOLD if positive, delete if not asked):     Constitutional:   - fever   - chills   - night sweats  - unexpected weight change            Ear/Nose/Throat/Mouth:   - hearing changes  - sore throat  - sinus congestion     Cardiovascular:   - chest pain         Respiratory:   - shortness of breath               Genitourinary:   - urinary incontinence  - increased urinary frequency  - painful urination  - blood in urine       Psychological:   - feelings of depression  - feelings of anxiety              Psychological:   - feeling generally happy  - feeling safe at home          Last Recorded Vitals:  Vitals:    05/28/25 1232   BP: 92/58   BP Location: Left arm   Patient Position: Sitting   BP Cuff Size: Adult   Pulse: 55   Weight: 51.3 kg (113 lb)   Height: 1.905 m (6' 3\")        Past Medical History:  He has a past medical history of Insomnia (12/11/2023), Irregular heart beat, and Myotonic muscular dystrophy (Multi).     Past Surgical History:  He has a past surgical history that includes Cholecystectomy.     Social History:  He reports that he has never smoked. He has been exposed to tobacco smoke. He has never used smokeless tobacco. He reports current alcohol use. He reports current drug use. Frequency: 7.00 times per week. Drug: Marijuana.     Family History:  Family History[1]  Allergies:  Peach and Tree nuts     Outpatient Medications:  Current Outpatient Medications   Medication " "Instructions    cholecalciferol (vitamin D3) 50 mcg, oral, Daily    fluticasone (Flonase) 50 mcg/actuation nasal spray 1 spray, Each Nostril, 2 times daily, Shake gently. Before first use, prime pump. After use, clean tip and replace cap.    hydrOXYzine (ATARAX) 0.5 mg/kg, oral, 3 times daily PRN    polyethylene glycol (GLYCOLAX, MIRALAX) 17 g, oral, Daily, Mix 1 cap (17g) into 8 ounces of fluid.        Physical Exam:  GENERAL: Well developed, well nourished, alert and cooperative, and appears to be in no acute distress.     PSYCH: mood pleasant and appropriate     HEAD: normocephalic     EYES: PERRL, EOMI. vision is grossly intact.           CARDIAC:   RRR   No murmur       LUNGS: Good respiratory effort.  Clear to auscultation bilaterally  No rales  No rhonchi  No wheezing         GAIT: Normal              NEUROLOGICAL:   CN II-XII grossly intact.          Last Labs:  CBC -  Lab Results   Component Value Date    WBC 5.2 08/12/2024    HGB 13.7 08/12/2024    HCT 44.5 08/12/2024    MCV 93 08/12/2024     08/12/2024        CMP -  Lab Results   Component Value Date    CALCIUM 9.9 08/12/2024    PHOS 1.0 (L) 07/06/2021    PROT 6.7 08/12/2024    ALBUMIN 4.7 08/12/2024    AST 23 08/12/2024    ALT 21 08/12/2024    ALKPHOS 64 08/12/2024    BILITOT 0.6 08/12/2024        LIPID PANEL -  Lab Results   Component Value Date    CHOL 133 08/12/2024    TRIG 105 08/12/2024    HDL 61.8 08/12/2024    CHHDL 2.2 08/12/2024    LDLF 43 09/20/2021    VLDL 21 08/12/2024    NHDL 71 08/12/2024           No results found for: \"BNP\", \"HGBA1C\"       XR foot left 3+ views  Narrative: Interpreted By:  Manfred Abel,   STUDY:  XR FOOT LEFT 3+ VIEWS; ;  10/21/2024 12:39 pm      INDICATION:  Signs/Symptoms:pain.          COMPARISON:  None.      ACCESSION NUMBER(S):  [id number]      ORDERING CLINICIAN:  JYOTSNA HAYWARD      FINDINGS:  Mild soft tissue swelling. No acute displaced fracture. No radiodense  foreign body.      Impression: No acute " osseous abnormality detected of the left foot.          MACRO:  None      Signed by: Manfred Abel 10/21/2024 12:44 PM  Dictation workstation:   [id number]         Assessment/Plan   Problem List Items Addressed This Visit           ICD-10-CM    Anxiety and depression F41.9, F32.A            Nice to meet you in the office today.     Anxiety atarax refilled.    Referral to neurology.     Referrals and advanced imaging scheduling line: [phone].  Another scheduling number is: [phone].  Another potential phone number is: [phone] ([phone]).  The number for pediatric referrals is: [phone].            No work note needed.      Follow up new PCP.           Brandon Rolon DO       [1]   Family History  Problem Relation Name Age of Onset    Multiple sclerosis Mother      Diabetes Father

## 2025-06-30 ENCOUNTER — APPOINTMENT (OUTPATIENT)
Dept: PHYSICAL THERAPY | Facility: CLINIC | Age: 30
End: 2025-06-30
Payer: COMMERCIAL

## 2025-06-30 NOTE — PROGRESS NOTES
Physical Therapy    Physical Therapy Evaluation and Treatment      Patient Name: Anthony Arshad  MRN: [id number]  Today's Date: 6/30/2025    Time Entry:                             Assessment:        Plan:       Current Problem:   No diagnosis found.    Subjective    General:     Precautions:     Vital Signs:     Pain:         Objective   Cognition:         Outcome Measures:  {PT Outcome Measures:41817}     Treatments:  {PT Treatments:05300}    EDUCATION:       Goals: